# Patient Record
Sex: MALE | Race: WHITE | NOT HISPANIC OR LATINO | Employment: FULL TIME | URBAN - METROPOLITAN AREA
[De-identification: names, ages, dates, MRNs, and addresses within clinical notes are randomized per-mention and may not be internally consistent; named-entity substitution may affect disease eponyms.]

---

## 2018-01-11 NOTE — RESULT NOTES
Message   please inform chest x-ray normal-thanks     Verified Results  * XR CHEST PA & LATERAL 18Apr2016 02:54PM Wili Yao Order Number: [id number]     Test Name Result Flag Reference   XR CHEST PA & LATERAL (Report)     CHEST      INDICATION: Cough  COMPARISON: 4/14/2015  VIEWS: Frontal and lateral projections; 2 images     FINDINGS:        Cardiomediastinal silhouette appears unremarkable  The lungs are clear  No pneumothorax or pleural effusion  Visualized osseous structures appear within normal limits for the patient's age  IMPRESSION:     No active pulmonary disease         Workstation performed: [id number]     Signed by:   Tomas Molina MD   4/18/16

## 2018-03-12 ENCOUNTER — OFFICE VISIT (OUTPATIENT)
Dept: FAMILY MEDICINE CLINIC | Facility: CLINIC | Age: 58
End: 2018-03-12
Payer: COMMERCIAL

## 2018-03-12 VITALS
BODY MASS INDEX: 29.56 KG/M2 | TEMPERATURE: 98 F | WEIGHT: 199.6 LBS | HEART RATE: 60 BPM | SYSTOLIC BLOOD PRESSURE: 126 MMHG | RESPIRATION RATE: 14 BRPM | HEIGHT: 69 IN | DIASTOLIC BLOOD PRESSURE: 84 MMHG

## 2018-03-12 DIAGNOSIS — R07.81 RIB PAIN ON RIGHT SIDE: ICD-10-CM

## 2018-03-12 DIAGNOSIS — N40.0 BENIGN PROSTATIC HYPERPLASIA WITHOUT LOWER URINARY TRACT SYMPTOMS: ICD-10-CM

## 2018-03-12 DIAGNOSIS — E78.49 OTHER HYPERLIPIDEMIA: ICD-10-CM

## 2018-03-12 DIAGNOSIS — I10 ESSENTIAL HYPERTENSION: Primary | ICD-10-CM

## 2018-03-12 DIAGNOSIS — R53.82 CHRONIC FATIGUE: ICD-10-CM

## 2018-03-12 PROCEDURE — 99214 OFFICE O/P EST MOD 30 MIN: CPT | Performed by: FAMILY MEDICINE

## 2018-03-12 RX ORDER — RUTIN/HESP/BIOFLAV/C/HERBAL196 40-25-50MG
TABLET ORAL
COMMUNITY

## 2018-03-12 RX ORDER — MULTIVIT WITH MINERALS/LUTEIN
1 TABLET ORAL DAILY
COMMUNITY
End: 2019-07-29

## 2018-03-12 RX ORDER — UBIDECARENONE 30 MG
CAPSULE ORAL
COMMUNITY

## 2018-03-12 RX ORDER — LISINOPRIL 20 MG/1
20 TABLET ORAL DAILY
COMMUNITY
End: 2018-04-13 | Stop reason: SDUPTHER

## 2018-03-13 NOTE — PATIENT INSTRUCTIONS

## 2018-03-14 ENCOUNTER — APPOINTMENT (OUTPATIENT)
Dept: RADIOLOGY | Facility: CLINIC | Age: 58
End: 2018-03-14
Payer: COMMERCIAL

## 2018-03-14 ENCOUNTER — TRANSCRIBE ORDERS (OUTPATIENT)
Dept: RADIOLOGY | Facility: CLINIC | Age: 58
End: 2018-03-14

## 2018-03-14 DIAGNOSIS — R07.81 RIB PAIN ON RIGHT SIDE: ICD-10-CM

## 2018-03-14 DIAGNOSIS — I10 ESSENTIAL HYPERTENSION: ICD-10-CM

## 2018-03-14 PROCEDURE — 71100 X-RAY EXAM RIBS UNI 2 VIEWS: CPT

## 2018-03-14 PROCEDURE — 71046 X-RAY EXAM CHEST 2 VIEWS: CPT

## 2018-03-14 NOTE — PROGRESS NOTES
Assessment/Plan:     Diagnoses and all orders for this visit:    Essential hypertension  Comments:  cont lisinopril  watch sodium, limit caffeine  check labs and sched eye exam   Orders:  -     Lipid panel; Future  -     XR chest pa & lateral; Future    Rib pain on right side  Comments:  check x-rays  limit nsaid use  try otc tylenol/analgesic creampatch  Orders:  -     XR ribs 2 vw right; Future    Other hyperlipidemia  -     Comprehensive metabolic panel; Future  -     TSH, 3rd generation; Future    Chronic fatigue  -     CBC and Platelet; Future    Benign prostatic hyperplasia without lower urinary tract symptoms  -     PSA, total and free; Future    Other orders  -     Bioflavonoid Products (BIOFLEX) TABS; Take by mouth  -     Multiple Vitamins-Minerals (MULTI FOR HIM 50+) TABS; Take by mouth  -     Ascorbic Acid (VITAMIN C) 1000 MG tablet; Take 1 tablet by mouth daily  -     lisinopril (ZESTRIL) 20 mg tablet; Take 20 mg by mouth daily            Subjective:      Patient ID: Birtni Brizuela is a 65 y o  male  Chief Complaint   Patient presents with    Blood Pressure Check    Chest Pain     pt complains of right rib pain  from old injury then it starting up again after shoveling and working       66 yo pt in for BP check  Hx white coat htn--initial reading high-rpt better-closer to home checks  Denies h/a or chest pressure bt is having pain over right anterior rib area--thinks flare of old injury w recent shoveling and inc in general home rps/chores  No diff breathing, no rash or fever  Due for labs and eye check  The following portions of the patient's history were reviewed and updated as appropriate: allergies, current medications, past family history, past medical history, past social history, past surgical history and problem list      Review of Systems   Constitutional: Positive for fatigue  Negative for fever  Respiratory: Negative for chest tightness, shortness of breath and wheezing  Cardiovascular: Negative  Musculoskeletal: Positive for arthralgias, back pain and myalgias  Negative for neck stiffness  Skin: Negative for rash  Neurological: Negative  Psychiatric/Behavioral: The patient is nervous/anxious  Objective:    /84 (BP Location: Left arm, Patient Position: Sitting)   Pulse 60   Temp 98 °F (36 7 °C)   Resp 14   Ht 5' 8 5" (1 74 m)   Wt 90 5 kg (199 lb 9 6 oz)   BMI 29 91 kg/m²        Physical Exam   Constitutional: He is oriented to person, place, and time  He appears well-developed and well-nourished  No distress  Neck: Normal range of motion  Neck supple  Cardiovascular: Normal rate and regular rhythm  No murmur heard  Pulmonary/Chest: Effort normal and breath sounds normal  He exhibits tenderness (over lower anterio right ribs-no crepitance, no bruising)  Abdominal: Soft  Bowel sounds are normal  There is no tenderness  Neurological: He is alert and oriented to person, place, and time  A cranial nerve deficit is present  Skin: Skin is warm and dry  No rash noted  Psychiatric:   anxious   Nursing note and vitals reviewed              Destiney Laughlin MD

## 2018-03-19 LAB
ALBUMIN SERPL-MCNC: 4.1 G/DL (ref 3.5–5.5)
ALBUMIN/GLOB SERPL: 1.6 {RATIO} (ref 1.2–2.2)
ALP SERPL-CCNC: 66 IU/L (ref 39–117)
ALT SERPL-CCNC: 26 IU/L (ref 0–44)
AMBIG ABBREV DEFAULT: NORMAL
AMBIG ABBREV DEFAULT: NORMAL
AST SERPL-CCNC: 23 IU/L (ref 0–40)
BILIRUB SERPL-MCNC: 0.4 MG/DL (ref 0–1.2)
BUN SERPL-MCNC: 15 MG/DL (ref 6–24)
BUN/CREAT SERPL: 15 (ref 9–20)
CALCIUM SERPL-MCNC: 9 MG/DL (ref 8.7–10.2)
CHLORIDE SERPL-SCNC: 101 MMOL/L (ref 96–106)
CHOLEST SERPL-MCNC: 196 MG/DL (ref 100–199)
CO2 SERPL-SCNC: 25 MMOL/L (ref 18–29)
CREAT SERPL-MCNC: 0.97 MG/DL (ref 0.76–1.27)
ERYTHROCYTE [DISTWIDTH] IN BLOOD BY AUTOMATED COUNT: 13.4 % (ref 12.3–15.4)
GLOBULIN SER-MCNC: 2.5 G/DL (ref 1.5–4.5)
GLUCOSE SERPL-MCNC: 101 MG/DL (ref 65–99)
HCT VFR BLD AUTO: 44.3 % (ref 37.5–51)
HDLC SERPL-MCNC: 50 MG/DL
HGB BLD-MCNC: 15 G/DL (ref 13–17.7)
LDLC SERPL CALC-MCNC: 122 MG/DL (ref 0–99)
MCH RBC QN AUTO: 31.8 PG (ref 26.6–33)
MCHC RBC AUTO-ENTMCNC: 33.9 G/DL (ref 31.5–35.7)
MCV RBC AUTO: 94 FL (ref 79–97)
MICRODELETION SYND BLD/T FISH: NORMAL
PLATELET # BLD AUTO: 240 X10E3/UL (ref 150–379)
POTASSIUM SERPL-SCNC: 4.5 MMOL/L (ref 3.5–5.2)
PROT SERPL-MCNC: 6.6 G/DL (ref 6–8.5)
PSA FREE MFR SERPL: 20 %
PSA FREE SERPL-MCNC: 0.08 NG/ML
PSA SERPL-MCNC: 0.4 NG/ML (ref 0–4)
RBC # BLD AUTO: 4.71 X10E6/UL (ref 4.14–5.8)
SL AMB EGFR AFRICAN AMERICAN: 100 ML/MIN/1.73
SL AMB EGFR NON AFRICAN AMERICAN: 87 ML/MIN/1.73
SL AMB VLDL CHOLESTEROL CALC: 24 MG/DL (ref 5–40)
SODIUM SERPL-SCNC: 141 MMOL/L (ref 134–144)
TRIGL SERPL-MCNC: 119 MG/DL (ref 0–149)
TSH SERPL DL<=0.005 MIU/L-ACNC: 1.6 UIU/ML (ref 0.45–4.5)
WBC # BLD AUTO: 4.9 X10E3/UL (ref 3.4–10.8)

## 2018-04-04 ENCOUNTER — TELEPHONE (OUTPATIENT)
Dept: FAMILY MEDICINE CLINIC | Facility: CLINIC | Age: 58
End: 2018-04-04

## 2018-04-13 ENCOUNTER — OFFICE VISIT (OUTPATIENT)
Dept: FAMILY MEDICINE CLINIC | Facility: CLINIC | Age: 58
End: 2018-04-13
Payer: COMMERCIAL

## 2018-04-13 VITALS
WEIGHT: 194.8 LBS | SYSTOLIC BLOOD PRESSURE: 122 MMHG | DIASTOLIC BLOOD PRESSURE: 80 MMHG | TEMPERATURE: 97.5 F | RESPIRATION RATE: 14 BRPM | HEIGHT: 69 IN | BODY MASS INDEX: 28.85 KG/M2 | HEART RATE: 88 BPM | OXYGEN SATURATION: 97 %

## 2018-04-13 DIAGNOSIS — J98.6 ELEVATED HEMIDIAPHRAGM: ICD-10-CM

## 2018-04-13 DIAGNOSIS — R07.89 CHEST WALL TENDERNESS: ICD-10-CM

## 2018-04-13 DIAGNOSIS — I10 ESSENTIAL HYPERTENSION: Primary | ICD-10-CM

## 2018-04-13 PROCEDURE — 3008F BODY MASS INDEX DOCD: CPT | Performed by: FAMILY MEDICINE

## 2018-04-13 PROCEDURE — 99214 OFFICE O/P EST MOD 30 MIN: CPT | Performed by: FAMILY MEDICINE

## 2018-04-13 PROCEDURE — 3074F SYST BP LT 130 MM HG: CPT | Performed by: FAMILY MEDICINE

## 2018-04-13 PROCEDURE — 3079F DIAST BP 80-89 MM HG: CPT | Performed by: FAMILY MEDICINE

## 2018-04-13 RX ORDER — LISINOPRIL 20 MG/1
20 TABLET ORAL DAILY
Qty: 90 TABLET | Refills: 3 | Status: SHIPPED | OUTPATIENT
Start: 2018-04-13 | End: 2019-04-21 | Stop reason: SDUPTHER

## 2018-04-13 NOTE — PATIENT INSTRUCTIONS
Chest Wall Pain, Ambulatory Care   GENERAL INFORMATION:   Chest wall pain  may be caused by problems with the muscles, cartilage, or bones of the chest wall  Chest wall pain may also be caused by pain that spreads to your chest from another part of your body  The pain may be aching, severe, dull, or sharp  It may come and go, or it may be constant  The pain may be worse when you move in certain ways, breathe deeply, or cough  Seek immediate care for the following symptoms:   · Severe pain    · You have any of the following signs of a heart attack:      ¨ Squeezing, pressure, or pain in your chest that lasts longer than 5 minutes or returns    ¨ Discomfort or pain in your back, neck, jaw, stomach, or arm     ¨ Trouble breathing    ¨ Nausea or vomiting    ¨ Lightheadedness or a sudden cold sweat, especially with chest pain or trouble breathing  Treatment  depends on the cause of your chest wall pain  You may need any of the following:  · NSAIDs  help decrease swelling and pain or fever  This medicine is available with or without a doctor's order  NSAIDs can cause stomach bleeding or kidney problems in certain people  If you take blood thinner medicine, always ask your healthcare provider if NSAIDs are safe for you  Always read the medicine label and follow directions  · Acetaminophen  decreases pain  It is available without a doctor's order  Ask how much to take and how often to take it  Follow directions  Acetaminophen can cause liver damage if not taken correctly  · Apply heat  on your chest for 20 to 30 minutes every 2 hours for as many days as directed  Heat helps decrease pain and muscle spasms  · Apply ice  on your chest for 15 to 20 minutes every hour or as directed  Use an ice pack, or put crushed ice in a plastic bag  Cover it with a towel  Ice helps prevent tissue damage and decreases swelling and pain    Follow up with your healthcare provider as directed:  Write down your questions so you remember to ask them during your visits  CARE AGREEMENT:   You have the right to help plan your care  Learn about your health condition and how it may be treated  Discuss treatment options with your caregivers to decide what care you want to receive  You always have the right to refuse treatment  The above information is an  only  It is not intended as medical advice for individual conditions or treatments  Talk to your doctor, nurse or pharmacist before following any medical regimen to see if it is safe and effective for you  © 2014 [address] is for End User's use only and may not be sold, redistributed or otherwise used for commercial purposes  All illustrations and images included in CareNotes® are the copyrighted property of A D A M [employer]  or Jaiden Chacon  Chronic Hypertension   AMBULATORY CARE:   Hypertension  is high blood pressure (BP)  Your BP is the force of your blood moving against the walls of your arteries  Normal BP is less than 120/80  Prehypertension is between 120/80 and 139/89  Hypertension is 140/90 or higher  Hypertension causes your BP to get so high that your heart has to work much harder than normal  This can damage your heart  Chronic hypertension is a long-term condition that you can control with a healthy lifestyle or medicines  A controlled blood pressure helps protect your organs, such as your heart, lungs, brain, and kidneys  Common symptoms include the following:   · Headache     · Blurred vision    · Chest pain     · Dizziness or weakness     · Trouble breathing     · Nosebleeds  Call 911 for any of the following:   · You have discomfort in your chest that feels like squeezing, pressure, fullness, or pain  · You become confused or have difficulty speaking  · You suddenly feel lightheaded or have trouble breathing  · You have pain or discomfort in your back, neck, jaw, stomach, or arm    Seek care immediately if: · You have a severe headache or vision loss  · You have weakness in an arm or leg  Contact your healthcare provider if:   · You feel faint, dizzy, confused, or drowsy  · You have been taking your BP medicine and your BP is still higher than your healthcare provider says it should be  · You have questions or concerns about your condition or care  Treatment for chronic hypertension  may include medicine to lower your BP and lower your cholesterol level  A low cholesterol level helps prevent heart disease and makes it easier to control your blood pressure  Heart disease can make your blood pressure harder to control  You may also need to make lifestyle changes  Take your medicine exactly as directed  Manage chronic hypertension:  Talk with your healthcare provider about these and other ways to manage hypertension:  · Take your BP at home  Sit and rest for 5 minutes before you take your BP  Extend your arm and support it on a flat surface  Your arm should be at the same level as your heart  Follow the directions that came with your BP monitor  If possible, take at least 2 BP readings each time  Take your BP at least twice a day at the same times each day, such as morning and evening  Keep a record of your BP readings and bring it to your follow-up visits  Ask your healthcare provider what your blood pressure should be  · Limit sodium (salt) as directed  Too much sodium can affect your fluid balance  Check labels to find low-sodium or no-salt-added foods  Some low-sodium foods use potassium salts for flavor  Too much potassium can also cause health problems  Your healthcare provider will tell you how much sodium and potassium are safe for you to have in a day  He or she may recommend that you limit sodium to 2,300 mg a day  · Follow the meal plan recommended by your healthcare provider    A dietitian or your provider can give you more information on low-sodium plans or the DASH (Dietary Approaches to Stop Hypertension) eating plan  The DASH plan is low in sodium, unhealthy fats, and total fat  It is high in potassium, calcium, and fiber  · Exercise to maintain a healthy weight  Exercise at least 30 minutes per day, on most days of the week  This will help decrease your blood pressure  Ask about the best exercise plan for you  · Decrease stress  This may help lower your BP  Learn ways to relax, such as deep breathing or listening to music  · Limit alcohol  Women should limit alcohol to 1 drink a day  Men should limit alcohol to 2 drinks a day  A drink of alcohol is 12 ounces of beer, 5 ounces of wine, or 1½ ounces of liquor  · Do not smoke  Nicotine and other chemicals in cigarettes and cigars can increase your BP and also cause lung damage  Ask your healthcare provider for information if you currently smoke and need help to quit  E-cigarettes or smokeless tobacco still contain nicotine  Talk to your healthcare provider before you use these products  Follow up with your healthcare provider as directed: You will need to return to have your BP checked and to have other lab tests done  Write down your questions so you remember to ask them during your visits  © 2017 [address] Information is for End User's use only and may not be sold, redistributed or otherwise used for commercial purposes  All illustrations and images included in CareNotes® are the copyrighted property of [employer]  or Jaiden Chacon  The above information is an  only  It is not intended as medical advice for individual conditions or treatments  Talk to your doctor, nurse or pharmacist before following any medical regimen to see if it is safe and effective for you

## 2018-04-16 NOTE — PROGRESS NOTES
Assessment/Plan:      Diagnoses and all orders for this visit:    Essential hypertension  Comments:  BP wnl  Cont current mgt  Orders:  -     lisinopril (ZESTRIL) 20 mg tablet; Take 1 tablet (20 mg total) by mouth daily  -     XR chest pa & lateral; Future    Elevated hemidiaphragm  Comments:  check follow-up imaging  Orders:  -     XR chest pa & lateral; Future    Chest wall tenderness  Comments:  improving but not resolved  otc analgeisc cream, tylenol, heating pad prn  Orders:  -     XR chest pa & lateral; Future         Subjective:      Patient ID: Ga Roberson is a 65 y o  male  Chief Complaint   Patient presents with    Results       64 yo pt in for BP check and to review recent tests  BP wnl-tolerating current med w/o adverse effect  Recent labs-wnl  Right rib x-rays-No fracture  CXR- Right hemidiaphragm elevated otherwise negative  Pt reports right chest wall tenderness improving but not resolved  Denies shortness of breath, rash , fever, cough or hemoptysisi  The following portions of the patient's history were reviewed and updated as appropriate: allergies, current medications, past family history, past medical history, past social history, past surgical history and problem list      Review of Systems   Constitutional: Positive for fatigue  Respiratory: Negative for chest tightness, shortness of breath and wheezing  Cardiovascular: Negative for chest pain and palpitations  Gastrointestinal: Negative  Musculoskeletal: Positive for arthralgias and myalgias  Neurological: Negative for headaches  Psychiatric/Behavioral: The patient is nervous/anxious  Objective:    /80 (BP Location: Left arm, Patient Position: Sitting, Cuff Size: Standard)   Pulse 88   Temp 97 5 °F (36 4 °C)   Resp 14   Ht 5' 8 5" (1 74 m)   Wt 88 4 kg (194 lb 12 8 oz)   SpO2 97%   BMI 29 19 kg/m²      Physical Exam   Constitutional: He is oriented to person, place, and time   He appears well-developed and well-nourished  No distress  Neck: Neck supple  Cardiovascular: Normal rate and regular rhythm  Pulmonary/Chest: Effort normal and breath sounds normal  He has no wheezes  He exhibits tenderness  Abdominal: Soft  Bowel sounds are normal  There is no tenderness  Neurological: He is alert and oriented to person, place, and time  No cranial nerve deficit  Skin: Skin is warm and dry  No rash noted  Psychiatric:   anxious   Nursing note and vitals reviewed  Labs;  Labs in chart were reviewed        Isidoro Villarreal MD

## 2018-05-10 ENCOUNTER — APPOINTMENT (OUTPATIENT)
Dept: RADIOLOGY | Facility: CLINIC | Age: 58
End: 2018-05-10
Payer: COMMERCIAL

## 2018-05-10 ENCOUNTER — TRANSCRIBE ORDERS (OUTPATIENT)
Dept: RADIOLOGY | Facility: CLINIC | Age: 58
End: 2018-05-10

## 2018-05-10 DIAGNOSIS — J98.6 ELEVATED HEMIDIAPHRAGM: ICD-10-CM

## 2018-05-10 DIAGNOSIS — R07.89 CHEST WALL TENDERNESS: ICD-10-CM

## 2018-05-10 DIAGNOSIS — I10 ESSENTIAL HYPERTENSION: ICD-10-CM

## 2018-05-10 PROCEDURE — 71046 X-RAY EXAM CHEST 2 VIEWS: CPT

## 2018-11-16 ENCOUNTER — HOSPITAL ENCOUNTER (EMERGENCY)
Facility: HOSPITAL | Age: 58
Discharge: HOME | End: 2018-11-16
Attending: EMERGENCY MEDICINE | Admitting: EMERGENCY MEDICINE
Payer: COMMERCIAL

## 2018-11-16 VITALS
TEMPERATURE: 98.4 F | OXYGEN SATURATION: 99 % | RESPIRATION RATE: 16 BRPM | DIASTOLIC BLOOD PRESSURE: 97 MMHG | SYSTOLIC BLOOD PRESSURE: 153 MMHG | WEIGHT: 195 LBS | BODY MASS INDEX: 29.22 KG/M2 | HEART RATE: 68 BPM

## 2018-11-16 DIAGNOSIS — S61.411A LACERATION OF RIGHT HAND WITHOUT FOREIGN BODY, INITIAL ENCOUNTER: Primary | ICD-10-CM

## 2018-11-16 PROCEDURE — 90471 IMMUNIZATION ADMIN: CPT

## 2018-11-16 PROCEDURE — 90715 TDAP VACCINE 7 YRS/> IM: CPT | Performed by: EMERGENCY MEDICINE

## 2018-11-16 PROCEDURE — 99282 EMERGENCY DEPT VISIT SF MDM: CPT

## 2018-11-16 RX ORDER — LIDOCAINE HYDROCHLORIDE AND EPINEPHRINE 10; 10 MG/ML; UG/ML
1 INJECTION, SOLUTION INFILTRATION; PERINEURAL ONCE
Status: COMPLETED | OUTPATIENT
Start: 2018-11-16 | End: 2018-11-16

## 2018-11-16 RX ADMIN — LIDOCAINE HYDROCHLORIDE,EPINEPHRINE BITARTRATE 1 ML: 10; .01 INJECTION, SOLUTION INFILTRATION; PERINEURAL at 20:07

## 2018-11-16 RX ADMIN — TETANUS TOXOID, REDUCED DIPHTHERIA TOXOID AND ACELLULAR PERTUSSIS VACCINE, ADSORBED 0.5 ML: 5; 2.5; 8; 8; 2.5 SUSPENSION INTRAMUSCULAR at 20:30

## 2018-11-17 NOTE — ED PROVIDER NOTES
History  Chief Complaint   Patient presents with    Laceration     cut top of right hand on a nail head     Patient was carrying an object in his right hand is was walking, and struck the back of his hand against a protruding screw, lacerating the dorsum of the right hand near the 4th MCP  No other injuries reported  Patient was able to control bleeding with direct pressure prior to arrival             Prior to Admission Medications   Prescriptions Last Dose Informant Patient Reported? Taking? Ascorbic Acid (VITAMIN C) 1000 MG tablet Unknown at Unknown time  Yes No   Sig: Take 1 tablet by mouth daily   Bioflavonoid Products (BIOFLEX) TABS 11/16/2018 at Unknown time  Yes Yes   Sig: Take by mouth   Multiple Vitamins-Minerals (MULTI FOR HIM 50+) TABS 11/16/2018 at Unknown time  Yes Yes   Sig: Take by mouth   lisinopril (ZESTRIL) 20 mg tablet 11/16/2018 at Unknown time  No Yes   Sig: Take 1 tablet (20 mg total) by mouth daily      Facility-Administered Medications: None       Past Medical History:   Diagnosis Date    Hypertension     Thrombosed external hemorrhoids 09/09/2010       History reviewed  No pertinent surgical history  Family History   Problem Relation Age of Onset    Heart attack Other     Heart attack Maternal Grandfather     Cancer Paternal Grandmother      I have reviewed and agree with the history as documented  Social History   Substance Use Topics    Smoking status: Former Smoker    Smokeless tobacco: Never Used    Alcohol use Yes      Comment: weekends        Review of Systems   Constitutional: Negative for chills and fever  HENT: Negative for congestion  Respiratory: Negative for cough  Cardiovascular: Negative for chest pain  Gastrointestinal: Negative for abdominal pain and vomiting  Musculoskeletal: Positive for joint swelling  Negative for arthralgias, gait problem and myalgias  Skin: Positive for wound  Neurological: Negative for weakness and numbness  Hematological: Does not bruise/bleed easily  Psychiatric/Behavioral: Negative for confusion  All other systems reviewed and are negative  Physical Exam  Physical Exam   Constitutional: He is oriented to person, place, and time  He appears well-developed and well-nourished  HENT:   Head: Normocephalic and atraumatic  Mouth/Throat: Oropharynx is clear and moist    Eyes: Conjunctivae are normal    Neck: Normal range of motion  Cardiovascular: Normal rate, regular rhythm and normal heart sounds  Pulmonary/Chest: Effort normal    Abdominal: Soft  There is no tenderness  Musculoskeletal: Normal range of motion  He exhibits deformity  Patient has an irregular laceration full-thickness in the dorsum of the right hand  The hand and fingers are all neurovascularly intact  Neurological: He is alert and oriented to person, place, and time  Skin: Skin is warm and dry  Psychiatric: He has a normal mood and affect  His behavior is normal    Nursing note and vitals reviewed        Vital Signs  ED Triage Vitals [11/16/18 1846]   Temperature Pulse Respirations Blood Pressure SpO2   98 4 °F (36 9 °C) 68 16 153/97 99 %      Temp Source Heart Rate Source Patient Position - Orthostatic VS BP Location FiO2 (%)   Tympanic Monitor Standing Right arm --      Pain Score       2           Vitals:    11/16/18 1846   BP: 153/97   Pulse: 68   Patient Position - Orthostatic VS: Standing       Visual Acuity      ED Medications  Medications   tetanus-diphtheria-acellular pertussis (BOOSTRIX) IM injection 0 5 mL (0 5 mL Intramuscular Given 11/16/18 2030)   lidocaine-epinephrine (XYLOCAINE/EPINEPHRINE) 1 %-1:100,000 injection 1 mL (1 mL Infiltration Given 11/16/18 2007)       Diagnostic Studies  Results Reviewed     None                 No orders to display              Procedures  Lac Repair  Date/Time: 11/16/2018 8:13 PM  Performed by: Davian Styles  Authorized by: Davian Styles   Consent: Verbal consent obtained  Risks and benefits: risks, benefits and alternatives were discussed  Consent given by: patient  Patient identity confirmed: verbally with patient  Body area: upper extremity  Location details: right hand  Laceration length: 4 cm  Foreign bodies: no foreign bodies  Anesthesia: local infiltration    Anesthesia:  Local Anesthetic: lidocaine 1% with epinephrine    Sedation:  Patient sedated: no      Procedure Details:  Irrigation solution: saline  Irrigation method: syringe  Amount of cleaning: standard  Skin closure: 4-0 nylon  Number of sutures: 4  Technique: horizontal mattress  Approximation: close  Approximation difficulty: simple  Dressing: antibiotic ointment  Patient tolerance: Patient tolerated the procedure well with no immediate complications             Phone Contacts  ED Phone Contact    ED Course                               MDM  Number of Diagnoses or Management Options  Laceration of right hand without foreign body, initial encounter:   Diagnosis management comments: Wound require suture laceration repair  Tetanus prophylaxis skin    CritCare Time    Disposition  Final diagnoses:   Laceration of right hand without foreign body, initial encounter     Time reflects when diagnosis was documented in both MDM as applicable and the Disposition within this note     Time User Action Codes Description Comment    11/16/2018  8:32 PM Rich Staff MARLENY Add [D90 238H] Laceration of right hand without foreign body, initial encounter       ED Disposition     ED Disposition Condition Comment    Discharge  [place] discharge to home/self care      Condition at discharge: Stable        Follow-up Information     Follow up With Specialties Details Why Bora Roy MD [facility] Schedule an appointment as soon as possible for a visit As needed [id number] [facility] [id number]  [phone]            Discharge Medication List as of 11/16/2018  8:33 PM      CONTINUE these medications which have NOT CHANGED    Details   Bioflavonoid Products (BIOFLEX) TABS Take by mouth, Historical Med      lisinopril (ZESTRIL) 20 mg tablet Take 1 tablet (20 mg total) by mouth daily, Starting Fri 4/13/2018, Normal      Multiple Vitamins-Minerals (MULTI FOR HIM 50+) TABS Take by mouth, Historical Med      Ascorbic Acid (VITAMIN C) 1000 MG tablet Take 1 tablet by mouth daily, Historical Med           No discharge procedures on file      ED Provider  Electronically Signed by           Jesika Morris MD  11/17/18 7647

## 2018-11-17 NOTE — DISCHARGE INSTRUCTIONS
Laceration   WHAT YOU NEED TO KNOW:   A laceration is an injury to the skin and the soft tissue underneath it  Lacerations happen when you are cut or hit by something  They can happen anywhere on the body  DISCHARGE INSTRUCTIONS:   Return to the emergency department if:   · You have heavy bleeding or bleeding that does not stop after 10 minutes of holding firm, direct pressure over the wound  · Your wound opens up  Contact your healthcare provider if:   · You have a fever or chills  · Your laceration is red, warm, or swollen  · You have red streaks on your skin coming from your wound  · You have white or yellow drainage from the wound that smells bad  · You have pain that gets worse, even after treatment  · You have questions or concerns about your condition or care  Medicines:   · Prescription pain medicine  may be given  Ask how to take this medicine safely  · Antibiotics  help treat or prevent a bacterial infection  · Take your medicine as directed  Contact your healthcare provider if you think your medicine is not helping or if you have side effects  Tell him or her if you are allergic to any medicine  Keep a list of the medicines, vitamins, and herbs you take  Include the amounts, and when and why you take them  Bring the list or the pill bottles to follow-up visits  Carry your medicine list with you in case of an emergency  Care for your wound as directed:   · Do not get your wound wet  until your healthcare provider says it is okay  Do not soak your wound in water  Do not go swimming until your healthcare provider says it is okay  Carefully wash the wound with soap and water  Gently pat the area dry or allow it to air dry  · Change your bandages  when they get wet, dirty, or after washing  Apply new, clean bandages as directed  Do not apply elastic bandages or tape too tight  Do not put powders or lotions over your incision  · Apply antibiotic ointment as directed  Your healthcare provider may give you antibiotic ointment to put over your wound if you have stitches  If you have strips of tape over your incision, let them dry up and fall off on their own  If they do not fall off within 14 days, gently remove them  If you have glue over your wound, do not remove or pick at it  If your glue comes off, do not replace it with glue that you have at home  · Check your wound every day for signs of infection such as swelling, redness, or pus  Self-care:   · Apply ice  on your wound for 15 to 20 minutes every hour or as directed  Use an ice pack, or put crushed ice in a plastic bag  Cover it with a towel  Ice helps prevent tissue damage and decreases swelling and pain  · Use a splint as directed  A splint will decrease movement and stress on your wound  It may help it heal faster  A splint may be used for lacerations over joints or areas of your body that bend  Ask your healthcare provider how to apply and remove a splint  · Decrease scarring of your wound  by applying ointments as directed  Do not apply ointments until your healthcare provider says it is okay  You may need to wait until your wound is healed  Ask which ointment to buy and how often to use it  After your wound is healed, use sunscreen over the area when you are out in the sun  You should do this for at least 6 months to 1 year after your injury  Follow up with your healthcare provider as directed: You may need to follow up in 24 to 48 hours to have your wound checked for infection  You will need to return in 3 to 14 days if you have stitches or staples so they can be removed  Care for your wound as directed to prevent infection and help it heal  Write down your questions so you remember to ask them during your visits  © 2017 [employer] Information is for End User's use only and may not be sold, redistributed or otherwise used for commercial purposes   All illustrations and images included in [facility] are the copyrighted property of A D [employer] M [employer] Inc  or Jaiden Chacon  The above information is an  only  It is not intended as medical advice for individual conditions or treatments  Talk to your doctor, nurse or pharmacist before following any medical regimen to see if it is safe and effective for you  SUTURE REMOVAL IN 10 TO 12 DAYS    BACITRACIN TO WOUND DAILY

## 2019-04-21 DIAGNOSIS — I10 ESSENTIAL HYPERTENSION: ICD-10-CM

## 2019-04-21 RX ORDER — LISINOPRIL 20 MG/1
TABLET ORAL
Qty: 90 TABLET | Refills: 3 | Status: SHIPPED | OUTPATIENT
Start: 2019-04-21 | End: 2020-03-31 | Stop reason: SDUPTHER

## 2019-07-17 ENCOUNTER — HOSPITAL ENCOUNTER (EMERGENCY)
Facility: HOSPITAL | Age: 59
Discharge: HOME | End: 2019-07-17
Attending: EMERGENCY MEDICINE | Admitting: EMERGENCY MEDICINE
Payer: OTHER MISCELLANEOUS

## 2019-07-17 ENCOUNTER — APPOINTMENT (EMERGENCY)
Dept: RADIOLOGY | Facility: HOSPITAL | Age: 59
End: 2019-07-17
Payer: OTHER MISCELLANEOUS

## 2019-07-17 VITALS
DIASTOLIC BLOOD PRESSURE: 74 MMHG | SYSTOLIC BLOOD PRESSURE: 138 MMHG | TEMPERATURE: 97.4 F | RESPIRATION RATE: 20 BRPM | HEART RATE: 66 BPM | OXYGEN SATURATION: 97 %

## 2019-07-17 DIAGNOSIS — S99.919A ANKLE INJURY: Primary | ICD-10-CM

## 2019-07-17 DIAGNOSIS — S91.019A LACERATION OF ANKLE: ICD-10-CM

## 2019-07-17 PROCEDURE — 73610 X-RAY EXAM OF ANKLE: CPT

## 2019-07-17 PROCEDURE — 99283 EMERGENCY DEPT VISIT LOW MDM: CPT

## 2019-07-17 RX ORDER — GINSENG 100 MG
1 CAPSULE ORAL ONCE
Status: COMPLETED | OUTPATIENT
Start: 2019-07-17 | End: 2019-07-17

## 2019-07-17 RX ORDER — LIDOCAINE HYDROCHLORIDE AND EPINEPHRINE 10; 10 MG/ML; UG/ML
10 INJECTION, SOLUTION INFILTRATION; PERINEURAL ONCE
Status: COMPLETED | OUTPATIENT
Start: 2019-07-17 | End: 2019-07-17

## 2019-07-17 RX ORDER — ACETAMINOPHEN 325 MG/1
650 TABLET ORAL ONCE
Status: COMPLETED | OUTPATIENT
Start: 2019-07-17 | End: 2019-07-17

## 2019-07-17 RX ADMIN — BACITRACIN ZINC 1 SMALL APPLICATION: 500 OINTMENT TOPICAL at 14:42

## 2019-07-17 RX ADMIN — LIDOCAINE HYDROCHLORIDE,EPINEPHRINE BITARTRATE 10 ML: 10; .01 INJECTION, SOLUTION INFILTRATION; PERINEURAL at 14:42

## 2019-07-17 RX ADMIN — ACETAMINOPHEN 650 MG: 325 TABLET, FILM COATED ORAL at 14:42

## 2019-07-17 NOTE — ED PROVIDER NOTES
History  Chief Complaint   Patient presents with    Foot Injury     steel beam fell on L foot/ankle     62 y/o male presenting with left ankle injury that occurred about an hour ago while he was working and an I-beam fell striking the inside of his left ankle  When this occurred caused him to fall and his foot was pinned underneath the I beam   Notes a laceration to the medial aspect of his ankle  He otherwise has minimal pain ranking 3/10 nonradiating  Did put some pressure on to the ankle however with some discomfort  Denies numbness, paresthesias, weakness, other joint pain  Prior to Admission Medications   Prescriptions Last Dose Informant Patient Reported? Taking? Ascorbic Acid (VITAMIN C) 1000 MG tablet   Yes No   Sig: Take 1 tablet by mouth daily   Bioflavonoid Products (BIOFLEX) TABS   Yes No   Sig: Take by mouth   Multiple Vitamins-Minerals (MULTI FOR HIM 50+) TABS   Yes No   Sig: Take by mouth   lisinopril (ZESTRIL) 20 mg tablet   No No   Sig: take 1 tablet by mouth once daily      Facility-Administered Medications: None       Past Medical History:   Diagnosis Date    Hypertension     Thrombosed external hemorrhoids 09/09/2010       History reviewed  No pertinent surgical history  Family History   Problem Relation Age of Onset    Heart attack Other     Heart attack Maternal Grandfather     Cancer Paternal Grandmother      I have reviewed and agree with the history as documented  Social History     Tobacco Use    Smoking status: Former Smoker    Smokeless tobacco: Never Used   Substance Use Topics    Alcohol use: Yes     Comment: weekends    Drug use: No        Review of Systems   Constitutional: Negative  HENT: Negative  Eyes: Negative  Respiratory: Negative  Cardiovascular: Negative  Gastrointestinal: Negative  Genitourinary: Negative  Musculoskeletal: Positive for arthralgias   Negative for back pain, gait problem, joint swelling, myalgias, neck pain and neck stiffness  Skin: Positive for wound  Negative for color change, pallor and rash  Neurological: Negative  All other systems reviewed and are negative  Physical Exam  Physical Exam   Constitutional: He is oriented to person, place, and time  He appears well-developed and well-nourished  HENT:   Head: Normocephalic and atraumatic  Cardiovascular: Normal rate and intact distal pulses  Pulmonary/Chest: Effort normal    S PO2 is 97% indicating adequate oxygenation   Musculoskeletal:        Feet:    Neurological: He is alert and oriented to person, place, and time  Skin: Skin is warm and dry  Capillary refill takes less than 2 seconds  Nursing note and vitals reviewed  Vital Signs  ED Triage Vitals [07/17/19 1426]   Temperature Pulse Respirations Blood Pressure SpO2   (!) 97 4 °F (36 3 °C) 66 20 138/74 97 %      Temp Source Heart Rate Source Patient Position - Orthostatic VS BP Location FiO2 (%)   Tympanic Monitor Sitting Left arm --      Pain Score       7           Vitals:    07/17/19 1426   BP: 138/74   Pulse: 66   Patient Position - Orthostatic VS: Sitting         Visual Acuity      ED Medications  Medications   acetaminophen (TYLENOL) tablet 650 mg (650 mg Oral Given 7/17/19 1442)   lidocaine-epinephrine (XYLOCAINE/EPINEPHRINE) 1 %-1:100,000 injection 10 mL (10 mL Infiltration Given 7/17/19 1442)   bacitracin topical ointment 1 small application (1 small application Topical Given 7/17/19 1442)       Diagnostic Studies  Results Reviewed     None                 XR ankle 3+ views LEFT   ED Interpretation by Brittany Bangura PA-C (07/17 5307)   No acute osseous abnormality  by Sadia Stone MD (64/01 1476)                 Procedures  Laceration repair  Date/Time: 7/17/2019 4:01 PM  Performed by: Brittany Bangura PA-C  Authorized by: Brittany Bangura PA-C   Consent: Verbal consent obtained    Risks and benefits: risks, benefits and alternatives were discussed  Consent given by: patient  Patient understanding: patient states understanding of the procedure being performed  Patient consent: the patient's understanding of the procedure matches consent given  Procedure consent: procedure consent matches procedure scheduled  Relevant documents: relevant documents present and verified  Test results: test results available and properly labeled  Site marked: the operative site was marked  Radiology Images displayed and confirmed  If images not available, report reviewed: imaging studies available  Required items: required blood products, implants, devices, and special equipment available  Patient identity confirmed: verbally with patient  Time out: Immediately prior to procedure a "time out" was called to verify the correct patient, procedure, equipment, support staff and site/side marked as required  Body area: lower extremity  Laceration length: 5 cm  Foreign body present: small pieces of dirt  Tendon involvement: none  Nerve involvement: none  Anesthesia: local infiltration    Anesthesia:  Local Anesthetic: lidocaine 1% with epinephrine    Wound Dehiscence:  Superficial Wound Dehiscence: simple closure      Procedure Details:  Irrigation solution: saline  Irrigation method: syringe  Amount of cleaning: extensive  Debridement: none  Degree of undermining: none  Skin closure: 5-0 nylon (5)  Subcutaneous closure: 4-0 Vicryl (3)  Number of sutures: 5  Technique: simple  Approximation: close  Approximation difficulty: simple  Dressing: antibiotic ointment, gauze roll and 4x4 sterile gauze  Patient tolerance: Patient tolerated the procedure well with no immediate complications             ED Course                               MDM  Number of Diagnoses or Management Options  Diagnosis management comments: No obvious fracture noted on xray  Patient has crutches at home for which he will use  Extremity is soft  Minimal tenderness  Lac closed with vicryl and nylon   Will have him return in 10 days or sooner for signs of infection or worsening pain  He is given proper wound care instructions  Patient verbalizes understanding and agrees with the above assessment and plan  Amount and/or Complexity of Data Reviewed  Tests in the radiology section of CPT®: ordered  Review and summarize past medical records: yes  Independent visualization of images, tracings, or specimens: yes        Disposition  Final diagnoses: Ankle injury   Laceration of ankle     Time reflects when diagnosis was documented in both MDM as applicable and the Disposition within this note     Time User Action Codes Description Comment    7/17/2019  4:07 PM Kiersten Arndt Add [C25 048I] Ankle injury     7/17/2019  4:07 PM Kiersten Arndt Add [K99 965U] Laceration of ankle       ED Disposition     ED Disposition Condition Date/Time Comment    Discharge Stable Wed Jul 17, 2019  4:07 PM Vanessa Baca discharge to home/self care  Follow-up Information     Follow up With Specialties Details Why Contact Info Additional [address] Emergency Department Emergency Medicine Go in 10 days For suture removal or sooner for any signs of infection [address] [facility] ED, [address], [facility]    Reva Zaragoza MD Family Medicine Schedule an appointment as soon as possible for a visit  As needed [address] [facility] [address] [phone]             Patient's Medications   Discharge Prescriptions    No medications on file     No discharge procedures on file      ED Provider  Electronically Signed by           Farida Shelton PA-C  07/17/19 0501

## 2019-07-17 NOTE — DISCHARGE INSTRUCTIONS
Please keep wound covered  Please place bacitracin or neosporin over wound twice a day  Do not get wound wet for 24 hours, after this time frame you may shower regularly

## 2019-07-29 ENCOUNTER — APPOINTMENT (EMERGENCY)
Dept: RADIOLOGY | Facility: HOSPITAL | Age: 59
End: 2019-07-29
Payer: OTHER MISCELLANEOUS

## 2019-07-29 ENCOUNTER — HOSPITAL ENCOUNTER (EMERGENCY)
Facility: HOSPITAL | Age: 59
Discharge: HOME | End: 2019-07-29
Attending: EMERGENCY MEDICINE
Payer: OTHER MISCELLANEOUS

## 2019-07-29 ENCOUNTER — TRANSCRIBE ORDERS (OUTPATIENT)
Dept: ADMINISTRATIVE | Facility: HOSPITAL | Age: 59
End: 2019-07-29

## 2019-07-29 VITALS
HEART RATE: 78 BPM | SYSTOLIC BLOOD PRESSURE: 162 MMHG | RESPIRATION RATE: 18 BRPM | DIASTOLIC BLOOD PRESSURE: 98 MMHG | OXYGEN SATURATION: 97 % | TEMPERATURE: 97.5 F | WEIGHT: 192 LBS | BODY MASS INDEX: 28.77 KG/M2

## 2019-07-29 DIAGNOSIS — M79.662 PAIN OF LEFT LOWER LEG: Primary | ICD-10-CM

## 2019-07-29 DIAGNOSIS — I82.402 ACUTE DEEP VEIN THROMBOSIS (DVT) OF LEFT LOWER EXTREMITY, UNSPECIFIED VEIN (HCC): ICD-10-CM

## 2019-07-29 DIAGNOSIS — S81.812A LACERATION OF LEFT LEG: Primary | ICD-10-CM

## 2019-07-29 PROCEDURE — 93971 EXTREMITY STUDY: CPT

## 2019-07-29 PROCEDURE — 99283 EMERGENCY DEPT VISIT LOW MDM: CPT

## 2019-07-29 NOTE — DISCHARGE INSTRUCTIONS
If any signs of infection please come back to the emergency department  Please take your antibiotics as given physician

## 2019-07-29 NOTE — ED PROVIDER NOTES
History  Chief Complaint   Patient presents with    Leg Swelling     States had a steel I beam fall on left foot and ankle on 7/17 , [facility] up and sent to ED today for vascular study for possible DVT of LLE  Script for arterial and venous duplex     HPI    73-year-old male that presents today with leg swelling  Patient states on the 17th of this month he had a BM following his ankle  He has a laceration and was showed no fracture  States he went to doctors and has been evaluated  Today his calf her since: And they sent him over to rule out DVT  Patient does have some mild erythema around the laceration in his already prescribed antibiotics  Patient does have range of motion of the ankle  No fevers no chills  73-year-old male that presents today with leg swelling  Will do a duplex study  Prior to Admission Medications   Prescriptions Last Dose Informant Patient Reported? Taking? Bioflavonoid Products ([address]) TABS 7/29/2019 at Unknown time  Yes Yes   Sig: Take by mouth   Cephalexin (KEFLEX PO)  Self Yes Yes   Sig: Take by mouth Prescribed today and did not fill yet   Multiple Vitamins-Minerals (MULTI FOR HIM 50+) TABS 7/29/2019 at Unknown time  Yes Yes   Sig: Take by mouth   lisinopril (ZESTRIL) 20 mg tablet 7/29/2019 at Unknown time  No Yes   Sig: take 1 tablet by mouth once daily      Facility-Administered Medications: None       Past Medical History:   Diagnosis Date    Hypertension     Thrombosed external hemorrhoids 09/09/2010       History reviewed  No pertinent surgical history  Family History   Problem Relation Age of Onset    Heart attack Other     Heart attack Maternal Grandfather     Cancer Paternal Grandmother      I have reviewed and agree with the history as documented      Social History     Tobacco Use    Smoking status: Former Smoker    Smokeless tobacco: Never Used   Substance Use Topics    Alcohol use: Yes     Comment: weekends    Drug use: No        Review of Systems   Constitutional: Negative  Negative for diaphoresis and fever  HENT: Negative  Respiratory: Negative  Negative for cough, shortness of breath and wheezing  Cardiovascular: Negative  Negative for chest pain, palpitations and leg swelling  Gastrointestinal: Negative for abdominal distention, abdominal pain, nausea and vomiting  Genitourinary: Negative  Musculoskeletal: Negative  Skin: Positive for wound  Neurological: Negative  Psychiatric/Behavioral: Negative  All other systems reviewed and are negative  Physical Exam  Physical Exam   Constitutional: He is oriented to person, place, and time  He appears well-developed and well-nourished  No distress  HENT:   Head: Normocephalic and atraumatic  Nose: Nose normal    Mouth/Throat: Oropharynx is clear and moist    Eyes: Pupils are equal, round, and reactive to light  Conjunctivae and EOM are normal    Neck: Normal range of motion  Neck supple  Cardiovascular: Normal rate, regular rhythm and normal heart sounds  No murmur heard  Pulmonary/Chest: Effort normal and breath sounds normal  No respiratory distress  He has no wheezes  He has no rales  Abdominal: Soft  Bowel sounds are normal  He exhibits no distension  There is no tenderness  There is no rebound and no guarding  Musculoskeletal: Normal range of motion  He exhibits edema and tenderness  He exhibits no deformity  Left leg swelling in the calf with a laceration to the medial aspect medial malleolus  Mild erythema around the site  No crepitus  Normal range of motion  2+ DP pulses  Normal color normal cap refill  Neurological: He is alert and oriented to person, place, and time  No cranial nerve deficit  Skin: Skin is warm and dry  No rash noted  He is not diaphoretic  No pallor  Psychiatric: He has a normal mood and affect  Vitals reviewed        Vital Signs  ED Triage Vitals [07/29/19 1749]   Temperature Pulse Respirations Blood Pressure SpO2   97 5 °F (36 4 °C) 70 20 153/96 96 %      Temp Source Heart Rate Source Patient Position - Orthostatic VS BP Location FiO2 (%)   Tympanic Monitor Sitting Left arm --      Pain Score       7           Vitals:    07/29/19 1915 07/29/19 1930 07/29/19 1945 07/29/19 2000   BP: 146/100 146/96 169/96 162/98   Pulse: 84  78    Patient Position - Orthostatic VS: Lying            Visual Acuity      ED Medications  Medications - No data to display    Diagnostic Studies  Results Reviewed     None                 VAS lower limb venous duplex study, unilateral/limited    (Results Pending)              Procedures  Procedures       ED Course  ED Course as of Jul 29 2351 Mon Jul 29, 2019   176 [facility] Patient vascular tech will be coming      1958 Duplex was negative                                  MDM    Disposition  Final diagnoses:   Laceration of left leg     Time reflects when diagnosis was documented in both MDM as applicable and the Disposition within this note     Time User Action Codes Description Comment    7/29/2019  7:59 PM Myahwes Bonds Add [G26 567E] Laceration of left leg       ED Disposition     ED Disposition Condition Date/Time Comment    Discharge Stable Mon Jul 29, 2019  7:58 PM Samuel Barron discharge to home/self care              Follow-up Information     Follow up With Specialties Details Why Jerod Timmons MD Family Medicine Schedule an appointment as soon as possible for a visit   [address] [facility][1] [phone]  [phone]            Discharge Medication List as of 7/29/2019  7:59 PM      CONTINUE these medications which have NOT CHANGED    Details   Bioflavonoid Products (BIOFLEX) TABS Take by mouth, Historical Med      Cephalexin (KEFLEX PO) Take by mouth Prescribed today and did not fill yet, Historical Med      lisinopril (ZESTRIL) 20 mg tablet take 1 tablet by mouth once daily, Normal      Multiple Vitamins-Minerals (MULTI FOR HIM 50+) TABS Take by mouth, Historical Med           No discharge procedures on file      ED Provider  Electronically Signed by           Loc Moon MD  07/29/19 7347

## 2019-07-30 PROCEDURE — 93971 EXTREMITY STUDY: CPT | Performed by: SURGERY

## 2020-03-03 ENCOUNTER — TELEPHONE (OUTPATIENT)
Dept: FAMILY MEDICINE CLINIC | Facility: CLINIC | Age: 60
End: 2020-03-03

## 2020-03-03 DIAGNOSIS — N40.0 BENIGN PROSTATIC HYPERPLASIA WITHOUT LOWER URINARY TRACT SYMPTOMS: ICD-10-CM

## 2020-03-03 DIAGNOSIS — E78.49 OTHER HYPERLIPIDEMIA: ICD-10-CM

## 2020-03-03 DIAGNOSIS — Z11.59 NEED FOR HEPATITIS C SCREENING TEST: ICD-10-CM

## 2020-03-03 DIAGNOSIS — R53.82 CHRONIC FATIGUE: ICD-10-CM

## 2020-03-03 DIAGNOSIS — Z12.5 PROSTATE CANCER SCREENING: ICD-10-CM

## 2020-03-03 DIAGNOSIS — I10 ESSENTIAL HYPERTENSION: Primary | ICD-10-CM

## 2020-03-08 LAB
ALBUMIN SERPL-MCNC: 4.4 G/DL (ref 3.8–4.9)
ALBUMIN/GLOB SERPL: 1.8 {RATIO} (ref 1.2–2.2)
ALP SERPL-CCNC: 58 IU/L (ref 39–117)
ALT SERPL-CCNC: 19 IU/L (ref 0–44)
AMYLASE SERPL-CCNC: 65 U/L (ref 31–110)
AST SERPL-CCNC: 16 IU/L (ref 0–40)
BASOPHILS # BLD AUTO: 0 X10E3/UL (ref 0–0.2)
BASOPHILS NFR BLD AUTO: 1 %
BILIRUB SERPL-MCNC: 0.5 MG/DL (ref 0–1.2)
BUN SERPL-MCNC: 19 MG/DL (ref 6–24)
BUN/CREAT SERPL: 20 (ref 9–20)
CALCIUM SERPL-MCNC: 9.6 MG/DL (ref 8.7–10.2)
CHLORIDE SERPL-SCNC: 104 MMOL/L (ref 96–106)
CHOLEST SERPL-MCNC: 196 MG/DL (ref 100–199)
CO2 SERPL-SCNC: 26 MMOL/L (ref 20–29)
CREAT SERPL-MCNC: 0.94 MG/DL (ref 0.76–1.27)
EOSINOPHIL # BLD AUTO: 0.2 X10E3/UL (ref 0–0.4)
EOSINOPHIL NFR BLD AUTO: 4 %
ERYTHROCYTE [DISTWIDTH] IN BLOOD BY AUTOMATED COUNT: 12.9 % (ref 11.6–15.4)
GLOBULIN SER-MCNC: 2.4 G/DL (ref 1.5–4.5)
GLUCOSE SERPL-MCNC: 102 MG/DL (ref 65–99)
HCT VFR BLD AUTO: 45.1 % (ref 37.5–51)
HCV AB S/CO SERPL IA: <0.1 S/CO RATIO (ref 0–0.9)
HDLC SERPL-MCNC: 52 MG/DL
HGB BLD-MCNC: 15.5 G/DL (ref 13–17.7)
IMM GRANULOCYTES # BLD: 0 X10E3/UL (ref 0–0.1)
IMM GRANULOCYTES NFR BLD: 0 %
LDLC SERPL CALC-MCNC: 128 MG/DL (ref 0–99)
LIPASE SERPL-CCNC: 38 U/L (ref 13–78)
LYMPHOCYTES # BLD AUTO: 1.1 X10E3/UL (ref 0.7–3.1)
LYMPHOCYTES NFR BLD AUTO: 26 %
MCH RBC QN AUTO: 32.3 PG (ref 26.6–33)
MCHC RBC AUTO-ENTMCNC: 34.4 G/DL (ref 31.5–35.7)
MCV RBC AUTO: 94 FL (ref 79–97)
MICRODELETION SYND BLD/T FISH: NORMAL
MONOCYTES # BLD AUTO: 0.5 X10E3/UL (ref 0.1–0.9)
MONOCYTES NFR BLD AUTO: 11 %
NEUTROPHILS # BLD AUTO: 2.4 X10E3/UL (ref 1.4–7)
NEUTROPHILS NFR BLD AUTO: 58 %
PLATELET # BLD AUTO: 248 X10E3/UL (ref 150–450)
POTASSIUM SERPL-SCNC: 4.9 MMOL/L (ref 3.5–5.2)
PROT SERPL-MCNC: 6.8 G/DL (ref 6–8.5)
PSA SERPL-MCNC: 0.4 NG/ML (ref 0–4)
RBC # BLD AUTO: 4.8 X10E6/UL (ref 4.14–5.8)
SL AMB EGFR AFRICAN AMERICAN: 103 ML/MIN/1.73
SL AMB EGFR NON AFRICAN AMERICAN: 89 ML/MIN/1.73
SODIUM SERPL-SCNC: 143 MMOL/L (ref 134–144)
TRIGL SERPL-MCNC: 81 MG/DL (ref 0–149)
TSH SERPL DL<=0.005 MIU/L-ACNC: 1.49 UIU/ML (ref 0.45–4.5)
WBC # BLD AUTO: 4.1 X10E3/UL (ref 3.4–10.8)

## 2020-03-09 ENCOUNTER — TELEPHONE (OUTPATIENT)
Dept: FAMILY MEDICINE CLINIC | Facility: CLINIC | Age: 60
End: 2020-03-09

## 2020-03-09 NOTE — TELEPHONE ENCOUNTER
----- Message from Dane Villanueva MD sent at 3/8/2020  8:11 AM EDT -----  Please inform labs overall good and mail copy to pt-thanks

## 2020-03-09 NOTE — TELEPHONE ENCOUNTER
Lmtrc, mailed pt lab results  tc/cma----- Message from Michelle Rowland MD sent at 3/8/2020  8:11 AM EDT -----  Please inform labs overall good and mail copy to pt-thanks

## 2020-03-31 ENCOUNTER — TELEMEDICINE (OUTPATIENT)
Dept: FAMILY MEDICINE CLINIC | Facility: CLINIC | Age: 60
End: 2020-03-31
Payer: COMMERCIAL

## 2020-03-31 DIAGNOSIS — F41.8 SITUATIONAL ANXIETY: ICD-10-CM

## 2020-03-31 DIAGNOSIS — I10 ESSENTIAL HYPERTENSION: Primary | ICD-10-CM

## 2020-03-31 PROCEDURE — 99214 OFFICE O/P EST MOD 30 MIN: CPT | Performed by: FAMILY MEDICINE

## 2020-03-31 RX ORDER — METOPROLOL SUCCINATE 25 MG/1
25 TABLET, EXTENDED RELEASE ORAL DAILY
Qty: 90 TABLET | Refills: 3 | Status: SHIPPED | OUTPATIENT
Start: 2020-03-31 | End: 2021-03-21

## 2020-03-31 RX ORDER — LISINOPRIL 20 MG/1
20 TABLET ORAL DAILY
Qty: 90 TABLET | Refills: 3 | Status: SHIPPED | OUTPATIENT
Start: 2020-03-31 | End: 2021-03-28

## 2020-03-31 NOTE — PROGRESS NOTES
Virtual Regular Visit    Problem List Items Addressed This Visit     Essential hypertension - Primary     Blood pressure readings running high and to above range  Has had element of white coat hypertension in the past  Does state he has been more anxious with current concerns related to the COVID-19 pandemic  Recent labs essentially within normal   Will add low-dose beta-blocker to ACE-inhibitor and continue to monitor  Patient to call in 2-3 weeks with BP readings and to reschedule physical in office for [facility]  Relevant Medications    metoprolol succinate (Toprol XL) 25 mg 24 hr tablet    lisinopril (ZESTRIL) 20 mg tablet    Situational anxiety     Will add low-dose beta-blocker to see if helps further lower blood pressure as well as help with anxiety  If not, to reconsider anxiolytic medication-eg low-dose SSRI- patient has declined in past                     Reason for visit is follow-up on blood pressure and labs  Encounter provider Brittney Love MD    Provider located at [address] [place] [id number]      Recent Visits  No visits were found meeting these conditions  Showing recent visits within past 7 days and meeting all other requirements     Today's Visits  Date Type Provider Dept   03/31/20 Telemedicine Brittney Love MD [address] [place] [address] today's visits and meeting all other requirements     Future Appointments  No visits were found meeting these conditions  Showing future appointments within next 150 days and meeting all other requirements        The patient was identified by name and date of birth  Germania Kesha was informed that this is a telemedicine visit and that the visit is being conducted through [address] [place] [address] and patient was informed that this is not a secure, HIPAA-complaint platform  he agrees to proceed     My office door was closed  No one else was in the room    He acknowledged consent and understanding of privacy and security of the video platform  The patient has agreed to participate and understands they can discontinue the visit at any time  Patient is aware this is a billable service  Subjective  Arlen Lawton is a 63 y o  male -virtual visit done in lieu of face-to-face encounter secondary to current COVID-19 pandemic  Patient did have labs done in beginning of month  which were essentially within normal and copy was sent to patient  Blood pressure has been running high end to above range  Current BP equal to 148/91 with heart rate of 67  He does continue to work for [employer]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  Overall has been feeling all right despite the increased stress  He denies any fever, chills, or any cough s,hortness of breath or chest pain  He has had no known exposures to COVID-19 or any other known illness  He has had no recent travel outside the area  He adheres to social distancing practices and wash his hands frequently throughout the day  Past Medical History:   Diagnosis Date    Anxiety     Hypertension     Thrombosed external hemorrhoids 09/09/2010       No past surgical history on file  Current Outpatient Medications   Medication Sig Dispense Refill    Bioflavonoid Products (BIOFLEX) TABS Take by mouth      lisinopril (ZESTRIL) 20 mg tablet Take 1 tablet (20 mg total) by mouth daily 90 tablet 3    metoprolol succinate (Toprol XL) 25 mg 24 hr tablet Take 1 tablet (25 mg total) by mouth daily 90 tablet 3    Multiple Vitamins-Minerals (MULTI FOR HIM 50+) TABS Take by mouth       No current facility-administered medications for this visit  No Known Allergies    Review of Systems   Constitutional: Positive for fatigue  Negative for fever  HENT: Negative  Eyes: Negative  Respiratory: Negative  Cardiovascular: Negative  Gastrointestinal: Negative  Musculoskeletal: Negative  Skin: Negative  Neurological: Negative  Psychiatric/Behavioral: Positive for sleep disturbance  The patient is nervous/anxious  Physical Exam   Constitutional: He is oriented to person, place, and time  He appears well-developed and well-nourished  Pulmonary/Chest: No respiratory distress  Neurological: He is alert and oriented to person, place, and time  Psychiatric:   Anxious-first time using facetime      BP on home monitor=148/91; HR=67  Other than being anxious patient is in no apparent distress  He is pleasant and cooperative  Speech is clear and thought process is intact-answers questions appropriately  I spent 25 minutes with the patient during this visit

## 2020-03-31 NOTE — ASSESSMENT & PLAN NOTE
Will add low-dose beta-blocker to see if helps further lower blood pressure as well as help with anxiety    If not, to reconsider anxiolytic medication-eg low-dose SSRI- patient has declined in past

## 2020-03-31 NOTE — ASSESSMENT & PLAN NOTE
Blood pressure readings running high and to above range  Has had element of white coat hypertension in the past  Does state he has been more anxious with current concerns related to the COVID-19 pandemic  Recent labs essentially within normal   Will add low-dose beta-blocker to ACE-inhibitor and continue to monitor  Patient to call in 2-3 weeks with BP readings and to reschedule physical in office for [facility]

## 2020-06-02 ENCOUNTER — OFFICE VISIT (OUTPATIENT)
Dept: FAMILY MEDICINE CLINIC | Facility: CLINIC | Age: 60
End: 2020-06-02
Payer: COMMERCIAL

## 2020-06-02 VITALS
HEART RATE: 66 BPM | DIASTOLIC BLOOD PRESSURE: 78 MMHG | HEIGHT: 68 IN | RESPIRATION RATE: 14 BRPM | BODY MASS INDEX: 28.04 KG/M2 | SYSTOLIC BLOOD PRESSURE: 128 MMHG | TEMPERATURE: 98.4 F | WEIGHT: 185 LBS

## 2020-06-02 DIAGNOSIS — I10 ESSENTIAL HYPERTENSION: ICD-10-CM

## 2020-06-02 DIAGNOSIS — F41.8 SITUATIONAL ANXIETY: ICD-10-CM

## 2020-06-02 DIAGNOSIS — Z12.11 ENCOUNTER FOR SCREENING COLONOSCOPY: ICD-10-CM

## 2020-06-02 DIAGNOSIS — Z00.00 PE (PHYSICAL EXAM), ANNUAL: Primary | ICD-10-CM

## 2020-06-02 LAB
SL AMB  POCT GLUCOSE, UA: NORMAL
SL AMB LEUKOCYTE ESTERASE,UA: NORMAL
SL AMB POCT BILIRUBIN,UA: NORMAL
SL AMB POCT BLOOD,UA: NORMAL
SL AMB POCT CLARITY,UA: CLEAR
SL AMB POCT COLOR,UA: YELLOW
SL AMB POCT KETONES,UA: NORMAL
SL AMB POCT NITRITE,UA: NORMAL
SL AMB POCT PH,UA: 5
SL AMB POCT SPECIFIC GRAVITY,UA: 1.02
SL AMB POCT URINE PROTEIN: NORMAL
SL AMB POCT UROBILINOGEN: NORMAL

## 2020-06-02 PROCEDURE — 99396 PREV VISIT EST AGE 40-64: CPT | Performed by: FAMILY MEDICINE

## 2020-06-02 PROCEDURE — 81003 URINALYSIS AUTO W/O SCOPE: CPT | Performed by: FAMILY MEDICINE

## 2020-06-05 PROBLEM — Z00.00 PE (PHYSICAL EXAM), ANNUAL: Status: ACTIVE | Noted: 2020-06-05

## 2020-06-05 PROBLEM — Z12.11 ENCOUNTER FOR SCREENING COLONOSCOPY: Status: ACTIVE | Noted: 2020-06-05

## 2020-06-05 PROCEDURE — 93000 ELECTROCARDIOGRAM COMPLETE: CPT | Performed by: FAMILY MEDICINE

## 2020-07-17 ENCOUNTER — OFFICE VISIT (OUTPATIENT)
Dept: URGENT CARE | Facility: CLINIC | Age: 60
End: 2020-07-17
Payer: COMMERCIAL

## 2020-07-17 VITALS
OXYGEN SATURATION: 99 % | BODY MASS INDEX: 27.13 KG/M2 | HEIGHT: 68 IN | SYSTOLIC BLOOD PRESSURE: 126 MMHG | TEMPERATURE: 98.3 F | WEIGHT: 179 LBS | RESPIRATION RATE: 18 BRPM | DIASTOLIC BLOOD PRESSURE: 89 MMHG | HEART RATE: 66 BPM

## 2020-07-17 DIAGNOSIS — S61.209A AVULSION OF FINGER TIP, INITIAL ENCOUNTER: Primary | ICD-10-CM

## 2020-07-17 PROCEDURE — 3079F DIAST BP 80-89 MM HG: CPT | Performed by: NURSE PRACTITIONER

## 2020-07-17 PROCEDURE — 3075F SYST BP GE 130 - 139MM HG: CPT | Performed by: NURSE PRACTITIONER

## 2020-07-17 PROCEDURE — 1036F TOBACCO NON-USER: CPT | Performed by: NURSE PRACTITIONER

## 2020-07-17 PROCEDURE — 99213 OFFICE O/P EST LOW 20 MIN: CPT | Performed by: NURSE PRACTITIONER

## 2020-07-17 PROCEDURE — 12001 RPR S/N/AX/GEN/TRNK 2.5CM/<: CPT | Performed by: NURSE PRACTITIONER

## 2020-07-17 NOTE — PROGRESS NOTES
[facility]        NAME: Jordan Turpin is a 62 y o  male  : 1960    MRN: [id number]  DATE: 2020  TIME: 6:58 PM    Assessment and Plan   Avulsion of finger tip, initial encounter [S61 209A]  1  Avulsion of finger tip, initial encounter       Laceration repair  Date/Time: 2020 6:24 PM  Performed by: Gayla Day  Authorized by: STACY Sumner   Consent: Verbal consent obtained  Risks and benefits: risks, benefits and alternatives were discussed  Consent given by: patient  Patient identity confirmed: verbally with patient  Time out: Immediately prior to procedure a "time out" was called to verify the correct patient, procedure, equipment, support staff and site/side marked as required  Body area: upper extremity  Location details: left ring finger  Laceration length: 1 5 cm      Procedure Details:  Dressing: pressure dressing  Patient tolerance: Patient tolerated the procedure well with no immediate complications  Comments: Tip of left finger was avulsed, the skin was not able to be sutured  Surgifoam and pressure dressing applied  Baseball splint given to protect finger  Pt tolerated procedure well  Patient Instructions     Keep dressing in place for 24 hours  Take down dressing in 24 hours, do not remove foam on tip of finger  Redress the finger as instructed  After 48 hours may gentle wash around the area  Change dressing daily  Follow up with PCP by Tuesday for wound check  Elevate for comfort  Tylenol as needed   May apply ice for comfort  If you develop fevers, N/V, foul drainage follow up as soon as possible  Follow up with PCP in 3-5 days  Proceed to  ER if symptoms worsen  Chief Complaint     Chief Complaint   Patient presents with    Laceration     Left ring finger laceration occurred approx 1 hour ago         History of Present Illness       63 y/o male presents for left tip of ring finger avulsion    He states that about 2 hours ago he was cutting wood with a hatchet and cut the tip of his left ring finger off  He washed the area well, was not able to retreive the tip  His Tdap is up to date  Denies any pain, numbness, or tingling in the finger  Review of Systems   Review of Systems   Constitutional: Negative for chills and fever  Skin: Positive for wound  All other systems reviewed and are negative  Current Medications       Current Outpatient Medications:     Bioflavonoid Products (BIOFLEX) TABS, Take by mouth, Disp: , Rfl:     lisinopril (ZESTRIL) 20 mg tablet, Take 1 tablet (20 mg total) by mouth daily, Disp: 90 tablet, Rfl: 3    metoprolol succinate (Toprol XL) 25 mg 24 hr tablet, Take 1 tablet (25 mg total) by mouth daily, Disp: 90 tablet, Rfl: 3    Multiple Vitamins-Minerals (MULTI FOR HIM 50+) TABS, Take by mouth, Disp: , Rfl:     Current Allergies     Allergies as of 07/17/2020    (No Known Allergies)            The following portions of the patient's history were reviewed and updated as appropriate: allergies, current medications, past family history, past medical history, past social history, past surgical history and problem list      Past Medical History:   Diagnosis Date    Anxiety     Closed right hand fracture 1977-78    Hypertension     Red-green color blindness     Thrombosed external hemorrhoids 09/09/2010       History reviewed  No pertinent surgical history  Family History   Problem Relation Age of Onset    Heart attack Other     Heart attack Maternal Grandfather     Cancer Paternal Grandmother     Heart disease Father     Arrhythmia Father     Stroke Father     Chronic infections Father          Medications have been verified          Objective   /89   Pulse 66   Temp 98 3 °F (36 8 °C)   Resp 18   Ht 5' 8" (1 727 m)   Wt 81 2 kg (179 lb)   SpO2 99%   BMI 27 22 kg/m²          Physical Exam     Physical Exam   Constitutional: He is oriented to person, place, and time  Vital signs are normal  He appears well-developed and well-nourished  No distress  Cardiovascular: Normal rate, regular rhythm and normal heart sounds  Pulmonary/Chest: Effort normal and breath sounds normal    Musculoskeletal: Normal range of motion  Neurological: He is alert and oriented to person, place, and time  GCS eye subscore is 4  GCS verbal subscore is 5  GCS motor subscore is 6  Skin: Skin is warm and dry  Laceration noted  To the tip of the left ring finger there is an avulsion present  Approximate size 1 0cm by 1 5 cm  Bleeding is controlled  Sensation is intact  Psychiatric: He has a normal mood and affect  His speech is normal and behavior is normal    Nursing note and vitals reviewed

## 2020-07-17 NOTE — LETTER
July 17, 2020     Patient: Uyen Pak   YOB: 1960   Date of Visit: 7/17/2020       To Whom It May Concern: It is my medical opinion that Uyen Pak should remain out of work until 7/21/2020  If you have any questions or concerns, please don't hesitate to call           Sincerely,        STACY Ponce

## 2020-07-17 NOTE — PATIENT INSTRUCTIONS
Keep dressing in place for 24 hours  Take down dressing in 24 hours, do not remove foam on tip of finger  Redress the finger as instructed  After 48 hours may gentle wash around the area  Change dressing daily  Follow up with PCP by Tuesday for wound check  Elevate for comfort  Tylenol as needed   May apply ice for comfort      If you develop fevers, N/V, foul drainage follow up as soon as possible

## 2020-07-20 ENCOUNTER — OFFICE VISIT (OUTPATIENT)
Dept: URGENT CARE | Facility: CLINIC | Age: 60
End: 2020-07-20

## 2020-07-20 VITALS
RESPIRATION RATE: 16 BRPM | TEMPERATURE: 98.8 F | SYSTOLIC BLOOD PRESSURE: 132 MMHG | DIASTOLIC BLOOD PRESSURE: 80 MMHG | HEART RATE: 70 BPM | HEIGHT: 68 IN | OXYGEN SATURATION: 96 % | WEIGHT: 189.6 LBS | BODY MASS INDEX: 28.73 KG/M2

## 2020-07-20 DIAGNOSIS — Z51.89 VISIT FOR WOUND CHECK: Primary | ICD-10-CM

## 2020-07-20 PROCEDURE — 3008F BODY MASS INDEX DOCD: CPT | Performed by: NURSE PRACTITIONER

## 2020-07-26 NOTE — PROGRESS NOTES
[facility]        NAME: Amina John is a 62 y o  male  : 1960    MRN: [id number]  DATE: 2020  TIME: 12:09 PM    Assessment and Plan   No primary diagnosis found  No diagnosis found  Patient Instructions       Follow up with PCP in 3-5 days  Proceed to  ER if symptoms worsen  Chief Complaint     Chief Complaint   Patient presents with    Wound Check     left 4th digit lac, here for wound check         History of Present Illness       Wound Check   He was originally treated 2 to 3 days ago  Previous treatment included wound cleansing or irrigation  The maximum temperature noted was less than 100 4 F  There has been no drainage from the wound  There is no redness present  There is no swelling present  There is no pain present  He has no difficulty moving the affected extremity or digit  Review of Systems   Review of Systems   Constitutional: Negative  Respiratory: Negative  Cardiovascular: Negative  Genitourinary: Negative  Skin: Positive for wound           Current Medications       Current Outpatient Medications:     Bioflavonoid Products (BIOFLEX) TABS, Take by mouth, Disp: , Rfl:     lisinopril (ZESTRIL) 20 mg tablet, Take 1 tablet (20 mg total) by mouth daily, Disp: 90 tablet, Rfl: 3    metoprolol succinate (Toprol XL) 25 mg 24 hr tablet, Take 1 tablet (25 mg total) by mouth daily, Disp: 90 tablet, Rfl: 3    Multiple Vitamins-Minerals (MULTI FOR HIM 50+) TABS, Take by mouth, Disp: , Rfl:     Current Allergies     Allergies as of 2020    (No Known Allergies)            The following portions of the patient's history were reviewed and updated as appropriate: allergies, current medications, past family history, past medical history, past social history, past surgical history and problem list      Past Medical History:   Diagnosis Date    Anxiety     Closed right hand fracture     Hypertension     Red-green color blindness     Thrombosed external hemorrhoids 09/09/2010       History reviewed  No pertinent surgical history  Family History   Problem Relation Age of Onset    Heart attack Other     Heart attack Maternal Grandfather     Cancer Paternal Grandmother     Heart disease Father     Arrhythmia Father     Stroke Father     Chronic infections Father          Medications have been verified  Objective   /80   Pulse 70   Temp 98 8 °F (37 1 °C)   Resp 16   Ht 5' 8" (1 727 m)   Wt 86 kg (189 lb 9 6 oz)   SpO2 96%   BMI 28 83 kg/m²        Physical Exam     Physical Exam   Constitutional: He appears well-developed and well-nourished  Cardiovascular: Normal rate and regular rhythm  Pulmonary/Chest: Effort normal and breath sounds normal    Abdominal: Soft  Bowel sounds are normal    Skin: Skin is warm  Capillary refill takes less than 2 seconds  Laceration noted  Nursing note and vitals reviewed

## 2020-07-26 NOTE — PATIENT INSTRUCTIONS
Laceration   AMBULATORY CARE:   A laceration  is an injury to the skin and the soft tissue underneath it  Lacerations happen when you are cut or hit by something  They can happen anywhere on the body  Common symptoms include the following:   · Injury or wound to skin and tissue of any shape size that looks like a cut, tear, or gash    · Edges of the wound may be close together or wide apart    · Pain, bleeding, bruising, or swelling    · Numbness around the wound    · Decreased movement in an area below the wound  Seek care immediately if:   · You have heavy bleeding or bleeding that does not stop after 10 minutes of holding firm, direct pressure over the wound  · Your wound opens up  Contact your healthcare provider if:   · You have a fever or chills  · Your laceration is red, warm, or swollen  · You have red streaks on your skin coming from your wound  · You have white or yellow drainage from the wound that smells bad  · You have pain that gets worse, even after treatment  · You have questions or concerns about your condition or care  Treatment for a laceration  includes care to stop any bleeding  Your healthcare provider will stop the bleeding by applying pressure to the wound  He may need to check your wound for foreign objects and clean it to decrease the chance of infection  Your laceration may be closed with stitches, staples, tissue glue, or medical strips  Ask your healthcare provider if you need a tetanus shot  Medicines:   · Prescription pain medicine  may be given  Ask how to take this medicine safely  · Antibiotics  help treat or prevent a bacterial infection  · Take your medicine as directed  Contact your healthcare provider if you think your medicine is not helping or if you have side effects  Tell him or her if you are allergic to any medicine  Keep a list of the medicines, vitamins, and herbs you take  Include the amounts, and when and why you take them   Bring the list or the pill bottles to follow-up visits  Carry your medicine list with you in case of an emergency  Care for your wound as directed:   · Do not get your wound wet  until your healthcare provider says it is okay  Do not soak your wound in water  Do not go swimming until your healthcare provider says it is okay  Carefully wash the wound with soap and water  Gently pat the area dry or allow it to air dry  · Change your bandages  when they get wet, dirty, or after washing  Apply new, clean bandages as directed  Do not apply elastic bandages or tape too tight  Do not put powders or lotions over your incision  · Apply antibiotic ointment as directed  Your healthcare provider may give you antibiotic ointment to put over your wound if you have stitches  If you have strips of tape over your incision, let them dry up and fall off on their own  If they do not fall off within 14 days, gently remove them  If you have glue over your wound, do not remove or pick at it  If your glue comes off, do not replace it with glue that you have at home  · Check your wound every day for signs of infection such as swelling, redness, or pus  Self-care:   · Apply ice  on your wound for 15 to 20 minutes every hour or as directed  Use an ice pack, or put crushed ice in a plastic bag  Cover it with a towel  Ice helps prevent tissue damage and decreases swelling and pain  · Use a splint as directed  A splint will decrease movement and stress on your wound  It may help it heal faster  A splint may be used for lacerations over joints or areas of your body that bend  Ask your healthcare provider how to apply and remove a splint  · Decrease scarring of your wound  by applying ointments as directed  Do not apply ointments until your healthcare provider says it is okay  You may need to wait until your wound is healed  Ask which ointment to buy and how often to use it   After your wound is healed, use sunscreen over the area when you are out in the sun  You should do this for at least 6 months to 1 year after your injury  Follow up with your healthcare provider as directed:  Write down your questions so you remember to ask them during your visits  © 2017 [employer] [employer] Information is for End User's use only and may not be sold, redistributed or otherwise used for commercial purposes  All illustrations and images included in CareNotes® are the copyrighted property of A D [employer] M [employer] Inc  or Jaiden Chacon  The above information is an  only  It is not intended as medical advice for individual conditions or treatments  Talk to your doctor, nurse or pharmacist before following any medical regimen to see if it is safe and effective for you

## 2020-10-10 ENCOUNTER — TELEPHONE (OUTPATIENT)
Dept: GASTROENTEROLOGY | Facility: CLINIC | Age: 60
End: 2020-10-10

## 2020-12-14 ENCOUNTER — VBI (OUTPATIENT)
Dept: ADMINISTRATIVE | Facility: OTHER | Age: 60
End: 2020-12-14

## 2021-03-21 DIAGNOSIS — I10 ESSENTIAL HYPERTENSION: ICD-10-CM

## 2021-03-21 RX ORDER — METOPROLOL SUCCINATE 25 MG/1
TABLET, EXTENDED RELEASE ORAL
Qty: 90 TABLET | Refills: 3 | Status: SHIPPED | OUTPATIENT
Start: 2021-03-21 | End: 2022-03-10

## 2021-03-28 DIAGNOSIS — I10 ESSENTIAL HYPERTENSION: ICD-10-CM

## 2021-03-28 RX ORDER — LISINOPRIL 20 MG/1
TABLET ORAL
Qty: 90 TABLET | Refills: 3 | Status: SHIPPED | OUTPATIENT
Start: 2021-03-28 | End: 2022-03-28

## 2021-09-22 ENCOUNTER — VBI (OUTPATIENT)
Dept: ADMINISTRATIVE | Facility: OTHER | Age: 61
End: 2021-09-22

## 2022-03-09 DIAGNOSIS — I10 ESSENTIAL HYPERTENSION: ICD-10-CM

## 2022-03-10 RX ORDER — METOPROLOL SUCCINATE 25 MG/1
TABLET, EXTENDED RELEASE ORAL
Qty: 90 TABLET | Refills: 3 | Status: SHIPPED | OUTPATIENT
Start: 2022-03-10

## 2022-03-27 DIAGNOSIS — I10 ESSENTIAL HYPERTENSION: ICD-10-CM

## 2022-03-28 RX ORDER — LISINOPRIL 20 MG/1
TABLET ORAL
Qty: 90 TABLET | Refills: 3 | Status: SHIPPED | OUTPATIENT
Start: 2022-03-28

## 2022-07-21 ENCOUNTER — VBI (OUTPATIENT)
Dept: ADMINISTRATIVE | Facility: OTHER | Age: 62
End: 2022-07-21

## 2023-03-09 DIAGNOSIS — I10 ESSENTIAL HYPERTENSION: ICD-10-CM

## 2023-03-09 RX ORDER — METOPROLOL SUCCINATE 25 MG/1
TABLET, EXTENDED RELEASE ORAL
Qty: 90 TABLET | Refills: 0 | Status: SHIPPED | OUTPATIENT
Start: 2023-03-09

## 2023-03-09 NOTE — TELEPHONE ENCOUNTER
Auth rx for 3mth supply--last OV 7/2020--needs appt and labs prior to further refills--please schedule physical exam appt--thanks

## 2023-03-22 DIAGNOSIS — I10 ESSENTIAL HYPERTENSION: ICD-10-CM

## 2023-03-22 RX ORDER — LISINOPRIL 20 MG/1
20 TABLET ORAL DAILY
Qty: 90 TABLET | Refills: 0 | Status: SHIPPED | OUTPATIENT
Start: 2023-03-22

## 2023-03-30 ENCOUNTER — RA CDI HCC (OUTPATIENT)
Dept: OTHER | Facility: HOSPITAL | Age: 63
End: 2023-03-30

## 2023-03-30 NOTE — PROGRESS NOTES
[facility]  coding opportunities       Chart reviewed, no opportunity found: CHART REVIEWED, NO OPPORTUNITY FOUND        Patients Insurance        Commercial Insurance: Swetha Miles

## 2023-04-10 PROBLEM — H00.014 HORDEOLUM EXTERNUM OF LEFT UPPER EYELID: Status: ACTIVE | Noted: 2023-04-10

## 2023-04-10 PROBLEM — Z13.220 SCREENING FOR LIPID DISORDERS: Status: ACTIVE | Noted: 2020-06-05

## 2023-04-10 PROBLEM — I45.10 NEW ONSET RIGHT BUNDLE BRANCH BLOCK (RBBB): Status: ACTIVE | Noted: 2023-04-10

## 2023-04-10 PROBLEM — Z13.29 SCREENING FOR THYROID DISORDER: Status: ACTIVE | Noted: 2020-06-05

## 2023-04-10 PROBLEM — Z12.12 ENCOUNTER FOR COLORECTAL CANCER SCREENING: Status: ACTIVE | Noted: 2020-06-05

## 2023-04-10 PROBLEM — Z82.3 FAMILY HISTORY OF STROKE: Status: ACTIVE | Noted: 2023-04-10

## 2023-04-10 PROBLEM — Z13.0 SCREENING FOR DEFICIENCY ANEMIA: Status: ACTIVE | Noted: 2020-06-05

## 2023-04-10 PROBLEM — Z12.5 SCREENING FOR PROSTATE CANCER: Status: ACTIVE | Noted: 2020-06-05

## 2023-05-13 LAB
ALBUMIN SERPL-MCNC: 4 G/DL (ref 3.8–4.8)
ALBUMIN/GLOB SERPL: 1.5 {RATIO} (ref 1.2–2.2)
ALP SERPL-CCNC: 64 IU/L (ref 44–121)
ALT SERPL-CCNC: 20 IU/L (ref 0–44)
AST SERPL-CCNC: 18 IU/L (ref 0–40)
BASOPHILS # BLD AUTO: 0 X10E3/UL (ref 0–0.2)
BASOPHILS NFR BLD AUTO: 1 %
BILIRUB SERPL-MCNC: 0.5 MG/DL (ref 0–1.2)
BUN SERPL-MCNC: 14 MG/DL (ref 8–27)
BUN/CREAT SERPL: 16 (ref 10–24)
CALCIUM SERPL-MCNC: 9.4 MG/DL (ref 8.6–10.2)
CHLORIDE SERPL-SCNC: 102 MMOL/L (ref 96–106)
CHOLEST SERPL-MCNC: 193 MG/DL (ref 100–199)
CO2 SERPL-SCNC: 25 MMOL/L (ref 20–29)
CREAT SERPL-MCNC: 0.85 MG/DL (ref 0.76–1.27)
EGFR: 99 ML/MIN/1.73
EOSINOPHIL # BLD AUTO: 0.3 X10E3/UL (ref 0–0.4)
EOSINOPHIL NFR BLD AUTO: 5 %
ERYTHROCYTE [DISTWIDTH] IN BLOOD BY AUTOMATED COUNT: 11.9 % (ref 11.6–15.4)
GLOBULIN SER-MCNC: 2.6 G/DL (ref 1.5–4.5)
GLUCOSE SERPL-MCNC: 108 MG/DL (ref 70–99)
HBA1C MFR BLD: 5.8 % (ref 4.8–5.6)
HCT VFR BLD AUTO: 43 % (ref 37.5–51)
HDLC SERPL-MCNC: 40 MG/DL
HGB BLD-MCNC: 14.6 G/DL (ref 13–17.7)
IMM GRANULOCYTES # BLD: 0 X10E3/UL (ref 0–0.1)
IMM GRANULOCYTES NFR BLD: 0 %
LDLC SERPL CALC-MCNC: 128 MG/DL (ref 0–99)
LIPASE SERPL-CCNC: 34 U/L (ref 13–78)
LYMPHOCYTES # BLD AUTO: 1.4 X10E3/UL (ref 0.7–3.1)
LYMPHOCYTES NFR BLD AUTO: 26 %
MAGNESIUM SERPL-MCNC: 2.2 MG/DL (ref 1.6–2.3)
MCH RBC QN AUTO: 32 PG (ref 26.6–33)
MCHC RBC AUTO-ENTMCNC: 34 G/DL (ref 31.5–35.7)
MCV RBC AUTO: 94 FL (ref 79–97)
MONOCYTES # BLD AUTO: 0.6 X10E3/UL (ref 0.1–0.9)
MONOCYTES NFR BLD AUTO: 12 %
NEUTROPHILS # BLD AUTO: 2.9 X10E3/UL (ref 1.4–7)
NEUTROPHILS NFR BLD AUTO: 56 %
PLATELET # BLD AUTO: 228 X10E3/UL (ref 150–450)
POTASSIUM SERPL-SCNC: 5 MMOL/L (ref 3.5–5.2)
PROT SERPL-MCNC: 6.6 G/DL (ref 6–8.5)
PSA SERPL-MCNC: 0.2 NG/ML (ref 0–4)
RBC # BLD AUTO: 4.56 X10E6/UL (ref 4.14–5.8)
SODIUM SERPL-SCNC: 140 MMOL/L (ref 134–144)
TRIGL SERPL-MCNC: 139 MG/DL (ref 0–149)
TSH SERPL DL<=0.005 MIU/L-ACNC: 2.49 UIU/ML (ref 0.45–4.5)
WBC # BLD AUTO: 5.2 X10E3/UL (ref 3.4–10.8)

## 2023-05-23 ENCOUNTER — CONSULT (OUTPATIENT)
Dept: CARDIOLOGY CLINIC | Facility: CLINIC | Age: 63
End: 2023-05-23

## 2023-05-23 VITALS
BODY MASS INDEX: 26.41 KG/M2 | OXYGEN SATURATION: 98 % | DIASTOLIC BLOOD PRESSURE: 88 MMHG | HEIGHT: 72 IN | SYSTOLIC BLOOD PRESSURE: 138 MMHG | HEART RATE: 49 BPM | WEIGHT: 195 LBS

## 2023-05-23 DIAGNOSIS — I10 ESSENTIAL HYPERTENSION: ICD-10-CM

## 2023-05-23 DIAGNOSIS — I45.10 NEW ONSET RIGHT BUNDLE BRANCH BLOCK (RBBB): Primary | ICD-10-CM

## 2023-05-23 DIAGNOSIS — Z82.3 FAMILY HISTORY OF STROKE: ICD-10-CM

## 2023-05-23 RX ORDER — HYDROCHLOROTHIAZIDE 25 MG/1
25 TABLET ORAL DAILY
Qty: 30 TABLET | Refills: 1 | Status: SHIPPED | OUTPATIENT
Start: 2023-05-23

## 2023-05-23 NOTE — PROGRESS NOTES
[facility]  [address]   [place], [address]    Cardiology Consultation    Melly Ramsey  [id number]  1960      Consult for: RBBB  Appreciate consult by: Carmelita Kennedy MD      Discussion/Summary:     1  New onset right bundle branch block (RBBB)  Ambulatory Referral to Cardiology    POCT ECG    Echo complete w/ contrast if indicated    Stress test only, exercise      2  Essential hypertension  Ambulatory Referral to Cardiology    POCT ECG    hydrochlorothiazide (HYDRODIURIL) 25 mg tablet    Echo complete w/ contrast if indicated    Stress test only, exercise      3  Family history of stroke  Ambulatory Referral to Cardiology    POCT ECG         - Patient with RBBB that is new  Was not present in the past    Reviewed with patient and his wife  - Will schedule for echocardiogram and treadmill stress test to rule out any structural heart disease   - Discontinue metoprolol and begin HCTZ - can combine in to combination pill with lisinopril if BP controlled and no significant side effects   - Discussed risk factor reduction including refraining from smoking, eating a diet high in fruits and vegetables, maintaining a healthy weight, limiting screen time along with controlling BP and cholesterol  Encouraged to exercise 150 minutes a week at a moderate level such as a fast walk or 75 minutes of high intensity   - May discontinue ASA  HPI:     Melly Ramsey is a 65 y o  here for valuation of new right bundle branch block  The patient was seen by Dr Jessica Orellana as part of routine physical   He did not have any symptoms during that visit  Routine ECG was done which showed a right bundle branch block  Review of previous ECGs did not show block present in the past   He feels well without any significant symptoms  He is very active at work and does not have any shortness of breath or chest pain  He does feel some shortness of breath as he bends over  he has mild lower extremity edema at the end of the day after standing  Denies any orthopnea or paroxysmal nocturnal dyspnea  No history of syncope or near syncope  Past Medical History:   Diagnosis Date   • Anxiety    • Closed right hand fracture    • COVID 2021   • Hypertension    • Red-green color blindness    • Thrombosed external hemorrhoids 2010     Social History     Tobacco Use   • Smoking status: Former     Types: Cigarettes     Start date: 1979     Quit date: 1981     Years since quittin 3   • Smokeless tobacco: Never   Vaping Use   • Vaping Use: Never used   Substance Use Topics   • Alcohol use: Yes     Comment: weekends   • Drug use: No      Family History   Problem Relation Age of Onset   • Heart disease Father    • Arrhythmia Father    • Stroke Father    • Chronic infections Father    • Heart attack Maternal Grandfather    • Cancer Paternal Grandmother    • Heart attack Other      History reviewed  No pertinent surgical history  Current Outpatient Medications:   •  Bioflavonoid Products (BIOFLEX) TABS, Take by mouth, Disp: , Rfl:   •  erythromycin (ILOTYCIN) ophthalmic ointment, Administer 0 5 inches into the left eye every 8 (eight) hours, Disp: 3 5 g, Rfl: 0  •  hydrochlorothiazide (HYDRODIURIL) 25 mg tablet, Take 1 tablet (25 mg total) by mouth daily, Disp: 30 tablet, Rfl: 1  •  lisinopril (ZESTRIL) 20 mg tablet, Take 1 tablet (20 mg total) by mouth daily, Disp: 90 tablet, Rfl: 0  •  Multiple Vitamins-Minerals (MULTI FOR HIM 50+) TABS, Take by mouth, Disp: , Rfl:   No Known Allergies    Review of Systems:   Review of Systems   Respiratory: Negative for shortness of breath  Cardiovascular: Positive for leg swelling  Negative for chest pain and palpitations  Musculoskeletal: Positive for arthralgias  All other systems reviewed and are negative        Physical Examination:     Vitals:    23 0802   BP: 138/88   BP Location: Right arm   Patient Position: "Sitting   Cuff Size: Standard   Pulse: (!) 49   SpO2: 98%   Weight: 88 5 kg (195 lb)   Height: 5' 11 5\" (1 816 m)       Physical Exam   Constitutional: He appears healthy  No distress  Eyes: Pupils are equal, round, and reactive to light  Conjunctivae are normal    Neck: No JVD present  Cardiovascular: Normal rate, regular rhythm and normal heart sounds  Exam reveals no gallop and no friction rub  No murmur heard  Pulmonary/Chest: Effort normal and breath sounds normal  He has no wheezes  He has no rales  Musculoskeletal:         General: No tenderness, deformity or edema  Cervical back: Normal range of motion and neck supple  Neurological: He is alert and oriented to person, place, and time  Skin: Skin is warm and dry          Labs:     Lab Results   Component Value Date    WBC 5 2 05/12/2023    HGB 14 6 05/12/2023    HCT 43 0 05/12/2023    MCV 94 05/12/2023    RDW 11 9 05/12/2023     05/12/2023     BMP:  Lab Results   Component Value Date    SODIUM 140 05/12/2023    K 5 0 05/12/2023     05/12/2023    CO2 25 05/12/2023    BUN 14 05/12/2023    CREATININE 0 85 05/12/2023    GLUC 108 (H) 05/12/2023    EGFR 99 05/12/2023    MG 2 2 05/12/2023     LFT:  Lab Results   Component Value Date    AST 18 05/12/2023    ALT 20 05/12/2023    TP 6 6 05/12/2023    ALB 4 0 05/12/2023      No results found for: [facility]  Lab Results   Component Value Date    HGBA1C 5 8 (H) 05/12/2023     Lipid Profile:   Lab Results   Component Value Date    CHOLESTEROL 193 05/12/2023    HDL 40 05/12/2023    LDLCALC 128 (H) 05/12/2023    TRIG 139 05/12/2023     Lab Results   Component Value Date    CHOLESTEROL 193 05/12/2023    CHOLESTEROL 196 03/07/2020     No results found for: CKTOTAL, CKMB, CKMBINDEX, TROPONINI  No results found for: NTBNP   Recent Results (from the past 672 hour(s))   Josh Barreto Default    Collection Time: 05/12/23  7:33 AM   Result Value Ref Range    White Blood Cell Count 5 2 3 4 - 10 8 " x10E3/uL    Red Blood Cell Count 4 56 4 14 - 5 80 x10E6/uL    Hemoglobin 14 6 13 0 - 17 7 g/dL    HCT 43 0 37 5 - 51 0 %    MCV 94 79 - 97 fL    MCH 32 0 26 6 - 33 0 pg    MCHC 34 0 31 5 - 35 7 g/dL    RDW 11 9 11 6 - 15 4 %    Platelet Count 065 953 - 450 x10E3/uL    Neutrophils 56 Not Estab  %    Lymphocytes 26 Not Estab  %    Monocytes 12 Not Estab  %    Eosinophils 5 Not Estab  %    Basophils PCT 1 Not Estab  %    Neutrophils (Absolute) 2 9 1 4 - 7 0 x10E3/uL    Lymphocytes (Absolute) 1 4 0 7 - 3 1 x10E3/uL    Monocytes (Absolute) 0 6 0 1 - 0 9 x10E3/uL    Eosinophils (Absolute) 0 3 0 0 - 0 4 x10E3/uL    Basophils ABS 0 0 0 0 - 0 2 x10E3/uL    Immature Granulocytes 0 Not Estab  %    Immature Granulocytes (Absolute) 0 0 0 0 - 0 1 x10E3/uL   Comprehensive metabolic panel    Collection Time: 05/12/23  7:33 AM   Result Value Ref Range    Glucose, Random 108 (H) 70 - 99 mg/dL    BUN 14 8 - 27 mg/dL    Creatinine 0 85 0 76 - 1 27 mg/dL    eGFR 99 >59 mL/min/1 73    SL AMB BUN/CREATININE RATIO 16 10 - 24    Sodium 140 134 - 144 mmol/L    Potassium 5 0 3 5 - 5 2 mmol/L    Chloride 102 96 - 106 mmol/L    CO2 25 20 - 29 mmol/L    CALCIUM 9 4 8 6 - 10 2 mg/dL    Protein, Total 6 6 6 0 - 8 5 g/dL    Albumin 4 0 3 8 - 4 8 g/dL    Globulin, Total 2 6 1 5 - 4 5 g/dL    Albumin/Globulin Ratio 1 5 1 2 - 2 2    TOTAL BILIRUBIN 0 5 0 0 - 1 2 mg/dL    Alk Phos Isoenzymes 64 44 - 121 IU/L    AST 18 0 - 40 IU/L    ALT 20 0 - 44 IU/L   Lipid panel    Collection Time: 05/12/23  7:33 AM   Result Value Ref Range    Cholesterol, Total 193 100 - 199 mg/dL    Triglycerides 139 0 - 149 mg/dL    HDL 40 >39 mg/dL    LDL Calculated 128 (H) 0 - 99 mg/dL   Hemoglobin A1c (w/out EAG)    Collection Time: 05/12/23  7:33 AM   Result Value Ref Range    Hemoglobin A1C 5 8 (H) 4 8 - 5 6 %   TSH, 3rd generation    Collection Time: 05/12/23  7:33 AM   Result Value Ref Range    TSH 2 490 0 450 - 4 500 uIU/mL   PSA Total, Diagnostic    Collection Time: 05/12/23  7:33 AM   Result Value Ref Range    Prostate Specific Antigen Total 0 2 0 0 - 4 0 ng/mL   Lipase    Collection Time: 05/12/23  7:33 AM   Result Value Ref Range    Lipase, Serum 34 13 - 78 U/L   Magnesium    Collection Time: 05/12/23  7:33 AM   Result Value Ref Range    Magnesium, Serum 2 2 1 6 - 2 3 mg/dL       Imaging & Testing   I have personally reviewed pertinent reports  Cardiac Testing   EKG: Personally reviewed  Sinus bradycardia with rate 25 [place] DO Peng Paulton  [phone]  Please call with any questions

## 2023-06-09 PROBLEM — Z12.5 SCREENING FOR PROSTATE CANCER: Status: RESOLVED | Noted: 2020-06-05 | Resolved: 2023-06-09

## 2023-06-14 DIAGNOSIS — I10 ESSENTIAL HYPERTENSION: ICD-10-CM

## 2023-06-14 RX ORDER — LISINOPRIL 20 MG/1
TABLET ORAL
Qty: 90 TABLET | Refills: 0 | Status: SHIPPED | OUTPATIENT
Start: 2023-06-14

## 2023-06-16 ENCOUNTER — HOSPITAL ENCOUNTER (OUTPATIENT)
Dept: NON INVASIVE DIAGNOSTICS | Facility: HOSPITAL | Age: 63
Discharge: HOME | End: 2023-06-16
Attending: INTERNAL MEDICINE
Payer: COMMERCIAL

## 2023-06-16 VITALS
WEIGHT: 195 LBS | HEART RATE: 64 BPM | DIASTOLIC BLOOD PRESSURE: 77 MMHG | HEIGHT: 72 IN | BODY MASS INDEX: 26.41 KG/M2 | SYSTOLIC BLOOD PRESSURE: 117 MMHG

## 2023-06-16 VITALS
SYSTOLIC BLOOD PRESSURE: 100 MMHG | DIASTOLIC BLOOD PRESSURE: 72 MMHG | OXYGEN SATURATION: 100 % | WEIGHT: 195 LBS | HEIGHT: 71 IN | BODY MASS INDEX: 27.3 KG/M2 | HEART RATE: 65 BPM

## 2023-06-16 DIAGNOSIS — I45.10 NEW ONSET RIGHT BUNDLE BRANCH BLOCK (RBBB): ICD-10-CM

## 2023-06-16 DIAGNOSIS — I10 ESSENTIAL HYPERTENSION: ICD-10-CM

## 2023-06-16 LAB
AORTIC ROOT: 3.5 CM
APICAL FOUR CHAMBER EJECTION FRACTION: 61 %
AV PEAK GRADIENT: 9 MMHG
CHEST PAIN STATEMENT: NORMAL
E WAVE DECELERATION TIME: 233 MS
FRACTIONAL SHORTENING: 35 (ref 28–44)
INTERVENTRICULAR SEPTUM IN DIASTOLE (PARASTERNAL SHORT AXIS VIEW): 1.1 CM
INTERVENTRICULAR SEPTUM: 1.1 CM (ref 0.6–1.1)
LAAS-AP2: 18.1 CM2
LAAS-AP4: 12.8 CM2
LEFT ATRIUM SIZE: 3.6 CM
LEFT INTERNAL DIMENSION IN SYSTOLE: 2.6 CM (ref 2.1–4)
LEFT VENTRICULAR INTERNAL DIMENSION IN DIASTOLE: 4 CM (ref 3.5–6)
LEFT VENTRICULAR POSTERIOR WALL IN END DIASTOLE: 1.1 CM
LEFT VENTRICULAR STROKE VOLUME: 45 ML
LVSV (TEICH): 45 ML
MAX DIASTOLIC BP: 80 MMHG
MAX HEART RATE: 137 BPM
MAX HR PERCENT: 86 %
MAX HR: 137 BPM
MAX PREDICTED HEART RATE: 158 BPM
MAX. SYSTOLIC BP: 180 MMHG
MV E'TISSUE VEL-SEP: 10 CM/S
MV PEAK A VEL: 0.67 M/S
MV PEAK E VEL: 65 CM/S
MV STENOSIS PRESSURE HALF TIME: 68 MS
MV VALVE AREA P 1/2 METHOD: 3.24
PROTOCOL NAME: NORMAL
RATE PRESSURE PRODUCT: NORMAL
REASON FOR TERMINATION: NORMAL
RIGHT ATRIUM AREA SYSTOLE A4C: 14.1 CM2
RIGHT VENTRICLE ID DIMENSION: 3.4 CM
SL CV LEFT ATRIUM LENGTH A2C: 4.8 CM
SL CV PED ECHO LEFT VENTRICLE DIASTOLIC VOLUME (MOD BIPLANE) 2D: 69 ML
SL CV PED ECHO LEFT VENTRICLE SYSTOLIC VOLUME (MOD BIPLANE) 2D: 24 ML
SL CV STRESS RECOVERY BP: NORMAL MMHG
SL CV STRESS RECOVERY HR: 90 BPM
SL CV STRESS RECOVERY O2 SAT: 100 %
SL CV STRESS STAGE REACHED: 4
STRESS ANGINA INDEX: 0
STRESS BASELINE BP: NORMAL MMHG
STRESS BASELINE HR: 65 BPM
STRESS O2 SAT REST: 100 %
STRESS PEAK HR: 136 BPM
STRESS POST ESTIMATED WORKLOAD: 13.4 METS
STRESS POST EXERCISE DUR MIN: 11 MIN
STRESS POST EXERCISE DUR SEC: 0 SEC
STRESS POST O2 SAT PEAK: 99 %
STRESS POST PEAK BP: 180 MMHG
TARGET HR FORMULA: NORMAL
TIME IN EXERCISE PHASE: NORMAL
TRICUSPID ANNULAR PLANE SYSTOLIC EXCURSION: 2.1 CM

## 2023-06-16 PROCEDURE — 93018 CV STRESS TEST I&R ONLY: CPT | Performed by: INTERNAL MEDICINE

## 2023-06-16 PROCEDURE — 93306 TTE W/DOPPLER COMPLETE: CPT

## 2023-06-16 PROCEDURE — 93017 CV STRESS TEST TRACING ONLY: CPT

## 2023-06-16 PROCEDURE — 93016 CV STRESS TEST SUPVJ ONLY: CPT | Performed by: INTERNAL MEDICINE

## 2023-06-16 PROCEDURE — 93306 TTE W/DOPPLER COMPLETE: CPT | Performed by: INTERNAL MEDICINE

## 2023-06-19 ENCOUNTER — TELEPHONE (OUTPATIENT)
Dept: CARDIOLOGY CLINIC | Facility: CLINIC | Age: 63
End: 2023-06-19

## 2023-07-24 ENCOUNTER — OFFICE VISIT (OUTPATIENT)
Dept: CARDIOLOGY CLINIC | Facility: CLINIC | Age: 63
End: 2023-07-24
Payer: COMMERCIAL

## 2023-07-24 VITALS — OXYGEN SATURATION: 96 % | DIASTOLIC BLOOD PRESSURE: 70 MMHG | HEART RATE: 64 BPM | SYSTOLIC BLOOD PRESSURE: 118 MMHG

## 2023-07-24 DIAGNOSIS — Z82.3 FAMILY HISTORY OF STROKE: ICD-10-CM

## 2023-07-24 DIAGNOSIS — I10 ESSENTIAL HYPERTENSION: Primary | ICD-10-CM

## 2023-07-24 DIAGNOSIS — I45.10 NEW ONSET RIGHT BUNDLE BRANCH BLOCK (RBBB): ICD-10-CM

## 2023-07-24 PROCEDURE — 99213 OFFICE O/P EST LOW 20 MIN: CPT | Performed by: INTERNAL MEDICINE

## 2023-07-24 RX ORDER — LISINOPRIL AND HYDROCHLOROTHIAZIDE 25; 20 MG/1; MG/1
1 TABLET ORAL DAILY
Qty: 90 TABLET | Refills: 3 | Status: SHIPPED | OUTPATIENT
Start: 2023-07-24

## 2023-07-24 NOTE — PROGRESS NOTES
Cardiology   Víctor Verdin DO, Ernst Dsouza MD, Analilia Hi MD, Tyler Knox MD, [facility] [place]  -------------------------------------------------------------------  [facility]  [address] [place] [address], [address] [place] [phone]    Cardiology Follow Up  Puneet Martínez  1960  [id number]          Assessment/Plan:    1. Essential hypertension    2. New onset right bundle branch block (RBBB)    3. Family history of stroke      - Reviewed recent testing with patient and his wife. No findings to suggest cardiac disease.  - BP better controlled with switch to HCTZ. Will combine lisinopril and hydrochlorothiazide into a single tablet. - Discussed risk factor reduction including refraining from smoking, eating a diet high in fruits and vegetables, maintaining a healthy weight, limiting screen time along with controlling BP and cholesterol. Encouraged to exercise 150 minutes a week at a moderate level such as a fast walk or 75 minutes of high intensity. Interval History:     Puneet Martínez is 59 y.o. male here for followup of recent cardiac testing. He had echocardiogram and stress test done because of evaluation of new ECG changes. He had interim development of RBBB. Blood pressure medications were also adjusted with discontinuation of metoprolol and initiation of hydrochlorothiazide. Blood pressure is well controlled today. Since his last visit, he has been feeling well.  he denies any palpitations, chest pain, shortness of breath, LE edema, orthopnea or PND. Diet is overall unchanged. There has not been a significant change in weight.         The following portions of the patient's history were reviewed and updated as appropriate: allergies, current medications, past family history, past medical history, past social history, past surgical history, and problem list.       Current Outpatient Medications:   • Bioflavonoid Products (BIOFLEX) TABS, Take by mouth, Disp: , Rfl:   •  hydrochlorothiazide (HYDRODIURIL) 25 mg tablet, Take 1 tablet (25 mg total) by mouth daily, Disp: 30 tablet, Rfl: 1  •  lisinopril (ZESTRIL) 20 mg tablet, take 1 tablet by mouth daily, Disp: 90 tablet, Rfl: 0  •  Multiple Vitamins-Minerals (MULTI FOR HIM 50+) TABS, Take by mouth, Disp: , Rfl:   •  erythromycin (ILOTYCIN) ophthalmic ointment, Administer 0.5 inches into the left eye every 8 (eight) hours (Patient not taking: Reported on 7/24/2023), Disp: 3.5 g, Rfl: 0        Review of Systems:  Review of Systems   Respiratory: Negative for shortness of breath. Cardiovascular: Negative for chest pain, palpitations and leg swelling. All other systems reviewed and are negative. Physical Exam:  Vitals:  Vitals:    07/24/23 0828   BP: 118/70   BP Location: Right arm   Patient Position: Sitting   Cuff Size: Standard   Pulse: 64   SpO2: 96%     Physical Exam   Constitutional: He appears healthy. No distress. Eyes: Pupils are equal, round, and reactive to light. Conjunctivae are normal.   Neck: No JVD present. Cardiovascular: Normal rate, regular rhythm and normal heart sounds. Exam reveals no gallop and no friction rub. No murmur heard. Pulmonary/Chest: Effort normal and breath sounds normal. He has no wheezes. He has no rales. Musculoskeletal:         General: No tenderness, deformity or edema. Cervical back: Normal range of motion and neck supple. Neurological: He is alert and oriented to person, place, and time. Skin: Skin is warm and dry. Cardiographics:  Last known EF: 55%    This note was completed in part utilizing [employer] Direct Software. Grammatical errors, random word insertions, spelling mistakes, and incomplete sentences can be an occasional consequence of this system secondary to software limitations, ambient noise, and hardware issues.   If you have any questions or concerns about the content, text, or information contained within the body of this dictation, please contact the provider for clarification.

## 2023-07-30 LAB
BUN SERPL-MCNC: 32 MG/DL (ref 8–27)
BUN/CREAT SERPL: 28 (ref 10–24)
CALCIUM SERPL-MCNC: 9.5 MG/DL (ref 8.6–10.2)
CHLORIDE SERPL-SCNC: 99 MMOL/L (ref 96–106)
CO2 SERPL-SCNC: 24 MMOL/L (ref 20–29)
CREAT SERPL-MCNC: 1.14 MG/DL (ref 0.76–1.27)
EGFR: 73 ML/MIN/1.73
GLUCOSE SERPL-MCNC: 103 MG/DL (ref 70–99)
POTASSIUM SERPL-SCNC: 4.6 MMOL/L (ref 3.5–5.2)
SODIUM SERPL-SCNC: 141 MMOL/L (ref 134–144)

## 2023-08-03 ENCOUNTER — TELEPHONE (OUTPATIENT)
Dept: CARDIOLOGY CLINIC | Facility: CLINIC | Age: 63
End: 2023-08-03

## 2023-08-03 NOTE — TELEPHONE ENCOUNTER
----- Message from Jamie Dixon DO sent at 8/3/2023 11:10 AM EDT -----  Can you please let the patient know blood work looked fine.   Maybe drink a little more water but otherwise okay

## 2024-07-16 DIAGNOSIS — I10 ESSENTIAL HYPERTENSION: ICD-10-CM

## 2024-07-17 RX ORDER — LISINOPRIL AND HYDROCHLOROTHIAZIDE 25; 20 MG/1; MG/1
1 TABLET ORAL DAILY
Qty: 100 TABLET | Refills: 1 | Status: SHIPPED | OUTPATIENT
Start: 2024-07-17

## 2024-11-14 ENCOUNTER — OFFICE VISIT (OUTPATIENT)
Dept: CARDIOLOGY CLINIC | Facility: CLINIC | Age: 64
End: 2024-11-14
Payer: COMMERCIAL

## 2024-11-14 VITALS
WEIGHT: 194 LBS | HEIGHT: 72 IN | HEART RATE: 81 BPM | BODY MASS INDEX: 26.28 KG/M2 | SYSTOLIC BLOOD PRESSURE: 126 MMHG | DIASTOLIC BLOOD PRESSURE: 76 MMHG

## 2024-11-14 DIAGNOSIS — I45.10 RBBB: ICD-10-CM

## 2024-11-14 DIAGNOSIS — I10 ESSENTIAL HYPERTENSION: Primary | ICD-10-CM

## 2024-11-14 PROCEDURE — 93000 ELECTROCARDIOGRAM COMPLETE: CPT | Performed by: INTERNAL MEDICINE

## 2024-11-14 PROCEDURE — 99214 OFFICE O/P EST MOD 30 MIN: CPT | Performed by: INTERNAL MEDICINE

## 2024-11-14 RX ORDER — LISINOPRIL AND HYDROCHLOROTHIAZIDE 20; 25 MG/1; MG/1
1 TABLET ORAL DAILY
Qty: 100 TABLET | Refills: 3 | Status: SHIPPED | OUTPATIENT
Start: 2024-11-14

## 2024-11-14 NOTE — PROGRESS NOTES
Cardiology   Kavin Yi DO, [facility]  Marcio Sharma MD, [facility]  Jovanni Lucero MD, [facility]  Dora Sanchez MD, [facility]  -------------------------------------------------------------------  [facility]  [address], [address]  [place], [place], [address]  [phone]    Cardiology Follow Up  Jay Turpin  1960  [id number]          Assessment/Plan:    1. Essential hypertension    2. RBBB      - BP better controlled with switch to HCTZ.  Will combine lisinopril and hydrochlorothiazide into a single tablet.  - Discussed risk factor reduction including refraining from smoking, eating a diet high in fruits and vegetables, maintaining a healthy weight, limiting screen time along with controlling BP and cholesterol.  Encouraged to exercise 150 minutes a week at a moderate level such as a fast walk or 75 minutes of high intensity.  - Lipid panel and CMP ordered        Interval History:     Jay Turpin is 64 y.o. male here for followup of hypertension.   Since his last visit, he has been feeling well.  he denies any palpitations, chest pain, shortness of breath, LE edema, orthopnea or PND.   Diet is overall unchanged.  There has not been a significant change in weight.  He is using lisinopril/HCTZ daily. Reports good adherence with medications.       He had echocardiogram and stress test done because of evaluation of new ECG changes.  He had interim development of RBBB.             The following portions of the patient's history were reviewed and updated as appropriate: allergies, current medications, past family history, past medical history, past social history, past surgical history, and problem list.       Current Outpatient Medications:     Bioflavonoid Products (BIOFLEX) TABS, Take by mouth, Disp: , Rfl:     lisinopril-hydrochlorothiazide (PRINZIDE,ZESTORETIC) 20-25 MG per tablet, Take 1 tablet by mouth daily, Disp: 100 tablet, Rfl: 1    Multiple  "Vitamins-Minerals (MULTI FOR HIM 50+) TABS, Take by mouth, Disp: , Rfl:     erythromycin (ILOTYCIN) ophthalmic ointment, Administer 0.5 inches into the left eye every 8 (eight) hours (Patient not taking: Reported on 7/24/2023), Disp: 3.5 g, Rfl: 0        Review of Systems:  Review of Systems   Respiratory:  Negative for shortness of breath.    Cardiovascular:  Negative for chest pain, palpitations and leg swelling.   All other systems reviewed and are negative.        Physical Exam:  Vitals:  Vitals:    11/14/24 1642   BP: 126/76   BP Location: Right arm   Patient Position: Sitting   Cuff Size: Standard   Pulse: 81   Weight: 88 kg (194 lb)   Height: 5' 11.5\" (1.816 m)     Physical Exam   Constitutional: He appears healthy. No distress.   Eyes: Pupils are equal, round, and reactive to light. Conjunctivae are normal.   Neck: No JVD present.   Cardiovascular: Normal rate, regular rhythm and normal heart sounds. Exam reveals no gallop and no friction rub.   No murmur heard.  Pulmonary/Chest: Effort normal and breath sounds normal. He has no wheezes. He has no rales.   Musculoskeletal:         General: No tenderness, deformity or edema.      Cervical back: Normal range of motion and neck supple.   Neurological: He is alert and oriented to person, place, and time.   Skin: Skin is warm and dry.        Cardiographics:  Last known EF: 55%    This note was completed in part utilizing M-[employer] Flu[employer] Direct Software.  Grammatical errors, random word insertions, spelling mistakes, and incomplete sentences can be an occasional consequence of this system secondary to software limitations, ambient noise, and hardware issues.  If you have any questions or concerns about the content, text, or information contained within the body of this dictation, please contact the provider for clarification.      "

## 2024-12-22 LAB
ALBUMIN SERPL-MCNC: 4.4 G/DL (ref 3.9–4.9)
ALP SERPL-CCNC: 74 IU/L (ref 44–121)
ALT SERPL-CCNC: 26 IU/L (ref 0–44)
AST SERPL-CCNC: 22 IU/L (ref 0–40)
BASOPHILS # BLD AUTO: 0 X10E3/UL (ref 0–0.2)
BASOPHILS NFR BLD AUTO: 0 %
BILIRUB SERPL-MCNC: 0.4 MG/DL (ref 0–1.2)
BUN SERPL-MCNC: 27 MG/DL (ref 8–27)
BUN/CREAT SERPL: 25 (ref 10–24)
CALCIUM SERPL-MCNC: 9.3 MG/DL (ref 8.6–10.2)
CHLORIDE SERPL-SCNC: 106 MMOL/L (ref 96–106)
CO2 SERPL-SCNC: 27 MMOL/L (ref 20–29)
CREAT SERPL-MCNC: 1.1 MG/DL (ref 0.76–1.27)
EGFR: 75 ML/MIN/1.73
EOSINOPHIL # BLD AUTO: 0.2 X10E3/UL (ref 0–0.4)
EOSINOPHIL NFR BLD AUTO: 3 %
ERYTHROCYTE [DISTWIDTH] IN BLOOD BY AUTOMATED COUNT: 11.8 % (ref 11.6–15.4)
GLOBULIN SER-MCNC: 2.3 G/DL (ref 1.5–4.5)
GLUCOSE SERPL-MCNC: 114 MG/DL (ref 70–99)
HBA1C MFR BLD: 5.6 % (ref 4.8–5.6)
HCT VFR BLD AUTO: 45 % (ref 37.5–51)
HGB BLD-MCNC: 14.7 G/DL (ref 13–17.7)
IMM GRANULOCYTES # BLD: 0 X10E3/UL (ref 0–0.1)
IMM GRANULOCYTES NFR BLD: 0 %
LYMPHOCYTES # BLD AUTO: 1.3 X10E3/UL (ref 0.7–3.1)
LYMPHOCYTES NFR BLD AUTO: 26 %
MCH RBC QN AUTO: 31.7 PG (ref 26.6–33)
MCHC RBC AUTO-ENTMCNC: 32.7 G/DL (ref 31.5–35.7)
MCV RBC AUTO: 97 FL (ref 79–97)
MONOCYTES # BLD AUTO: 0.6 X10E3/UL (ref 0.1–0.9)
MONOCYTES NFR BLD AUTO: 12 %
NEUTROPHILS # BLD AUTO: 2.9 X10E3/UL (ref 1.4–7)
NEUTROPHILS NFR BLD AUTO: 59 %
PLATELET # BLD AUTO: 266 X10E3/UL (ref 150–450)
POTASSIUM SERPL-SCNC: 4.4 MMOL/L (ref 3.5–5.2)
PROT SERPL-MCNC: 6.7 G/DL (ref 6–8.5)
RBC # BLD AUTO: 4.63 X10E6/UL (ref 4.14–5.8)
SODIUM SERPL-SCNC: 147 MMOL/L (ref 134–144)
WBC # BLD AUTO: 5 X10E3/UL (ref 3.4–10.8)

## 2024-12-23 ENCOUNTER — RESULTS FOLLOW-UP (OUTPATIENT)
Dept: CARDIOLOGY CLINIC | Facility: CLINIC | Age: 64
End: 2024-12-23

## 2024-12-24 NOTE — TELEPHONE ENCOUNTER
Tried to reach patient to relay results, but mailbox was no set up.  Will try again when back in office on Thursday.